# Patient Record
Sex: FEMALE | Race: WHITE | Employment: FULL TIME | URBAN - METROPOLITAN AREA
[De-identification: names, ages, dates, MRNs, and addresses within clinical notes are randomized per-mention and may not be internally consistent; named-entity substitution may affect disease eponyms.]

---

## 2019-11-04 ENCOUNTER — HOSPITAL ENCOUNTER (EMERGENCY)
Age: 49
Discharge: STILL ADMITTED OR ADMINISTRATIVE | End: 2019-11-04
Attending: EMERGENCY MEDICINE
Payer: COMMERCIAL

## 2019-11-04 ENCOUNTER — HOSPITAL ENCOUNTER (OUTPATIENT)
Dept: MRI IMAGING | Age: 49
Discharge: HOME | End: 2019-11-04
Attending: OBSTETRICS & GYNECOLOGY
Payer: COMMERCIAL

## 2019-11-04 VITALS — WEIGHT: 142 LBS

## 2019-11-04 DIAGNOSIS — N90.7 VULVAR CYST: ICD-10-CM

## 2019-11-04 DIAGNOSIS — Z53.21 PATIENT LEFT WITHOUT BEING SEEN: Primary | ICD-10-CM

## 2019-11-04 PROCEDURE — 72197 MRI PELVIS W/O & W/DYE: CPT

## 2019-11-04 PROCEDURE — 74011250636 HC RX REV CODE- 250/636: Performed by: OBSTETRICS & GYNECOLOGY

## 2019-11-04 PROCEDURE — A9575 INJ GADOTERATE MEGLUMI 0.1ML: HCPCS | Performed by: OBSTETRICS & GYNECOLOGY

## 2019-11-04 PROCEDURE — 75810000275 HC EMERGENCY DEPT VISIT NO LEVEL OF CARE

## 2019-11-04 RX ORDER — SODIUM CHLORIDE 0.9 % (FLUSH) 0.9 %
10 SYRINGE (ML) INJECTION
Status: COMPLETED | OUTPATIENT
Start: 2019-11-04 | End: 2019-11-04

## 2019-11-04 RX ORDER — GADOTERATE MEGLUMINE 376.9 MG/ML
13 INJECTION INTRAVENOUS
Status: COMPLETED | OUTPATIENT
Start: 2019-11-04 | End: 2019-11-04

## 2019-11-04 RX ADMIN — Medication 10 ML: at 18:00

## 2019-11-04 RX ADMIN — GADOTERATE MEGLUMINE 13 ML: 376.9 INJECTION INTRAVENOUS at 18:00

## 2019-11-04 NOTE — ED PROVIDER NOTES
HPI     5:16 PM    I was inadvertently assigned to this patient's treatment team.  I did not see this patient nor did I have any contact with this patient. I had no involvement during the evaluation, treatment or disposition of this patient. I am signing off this note to indicate only why my name appeared in the record. Lopez Arevalo MD    No past medical history on file. No past surgical history on file. No family history on file. Social History     Socioeconomic History    Marital status:      Spouse name: Not on file    Number of children: Not on file    Years of education: Not on file    Highest education level: Not on file   Occupational History    Not on file   Social Needs    Financial resource strain: Not on file    Food insecurity:     Worry: Not on file     Inability: Not on file    Transportation needs:     Medical: Not on file     Non-medical: Not on file   Tobacco Use    Smoking status: Not on file   Substance and Sexual Activity    Alcohol use: Not on file    Drug use: Not on file    Sexual activity: Not on file   Lifestyle    Physical activity:     Days per week: Not on file     Minutes per session: Not on file    Stress: Not on file   Relationships    Social connections:     Talks on phone: Not on file     Gets together: Not on file     Attends Sikhism service: Not on file     Active member of club or organization: Not on file     Attends meetings of clubs or organizations: Not on file     Relationship status: Not on file    Intimate partner violence:     Fear of current or ex partner: Not on file     Emotionally abused: Not on file     Physically abused: Not on file     Forced sexual activity: Not on file   Other Topics Concern    Not on file   Social History Narrative    Not on file         ALLERGIES: Patient has no allergy information on record. Review of Systems    There were no vitals filed for this visit.          Physical Exam     MDM Procedures

## 2019-11-11 ENCOUNTER — OFFICE VISIT (OUTPATIENT)
Dept: GYNECOLOGY | Age: 49
End: 2019-11-11

## 2019-11-11 VITALS
HEIGHT: 66 IN | WEIGHT: 146.2 LBS | BODY MASS INDEX: 23.5 KG/M2 | HEART RATE: 61 BPM | DIASTOLIC BLOOD PRESSURE: 86 MMHG | SYSTOLIC BLOOD PRESSURE: 139 MMHG

## 2019-11-11 DIAGNOSIS — N90.89 VULVAR MASS: Primary | ICD-10-CM

## 2019-11-11 DIAGNOSIS — D17.30 LIPOMA OF SKIN: ICD-10-CM

## 2019-11-11 RX ORDER — NORETHINDRONE ACETATE AND ETHINYL ESTRADIOL, AND FERROUS FUMARATE 1MG-20(24)
KIT ORAL
Refills: 3 | Status: ON HOLD | COMMUNITY
Start: 2019-08-31 | End: 2022-02-04

## 2019-11-11 RX ORDER — GLYCOPYRROLATE 1 MG/1
TABLET ORAL
Refills: 7 | COMMUNITY
Start: 2019-11-06

## 2019-11-11 NOTE — LETTER
11/11/2019 1:46 PM 
 
Patient:  Jailyn Wright YOB: 1970 Date of Visit: 11/11/2019 Jose Manuel Martinez MD 
[address] [address] VIA In Basket Dear Jose Manuel Martinez MD, Thank you for referring Ms. Jailyn Wright to [address] [place] [address] for evaluation and treatment. Below are the relevant portions of my assessment and plan of care. Ms. Jailyn Wright is a  47 y.o. female with a vulvar mass that appears consistent with a lipoma. I have recommended a resection of the lipoma, which the patient will schedule at her own convenience in the coming month. Her risk of malignancy is low based on her exam and MRI findings, <1%. Thank you very much for your referral of Ms. Jailyn Wright. If you have questions, please do not hesitate to call me. I look forward to following Ms. Tequila Kuhn along with you and will keep you updated as to her progress.   
 
 
 
 
Sincerely, 
 
 
Martha Quinn MD

## 2019-11-11 NOTE — PROGRESS NOTES
New Patient, Referred by Dr. Nika Jones for vulva mass    1. Have you been to the ER, urgent care clinic since your last visit? Hospitalized since your last visit?  no    2. Have you seen or consulted any other health care providers outside of the [address] [place] [address] since your last visit? Include any pap smears or colon screening.    no

## 2019-11-11 NOTE — PROGRESS NOTES
27 [facility] [address], [address] [place] [address] [phone]  F [phone]    Office Note  Patient ID:  Name:  Kaycee Lozada  MRN:  [id number]  :  1970/49 y.o. Date:  2019      HISTORY OF PRESENT ILLNESS:  Ms. Kaycee Lozada is a 47 y.o.  postmenopausal female who presents in consultation from Dr. Louann Marie for a left vulvar mass. The patient reports that about a month ago she noticed that her left vulva felt different at the time of shower. Denies pain in this area or skin changes. She ultimately presented to her dermatologist, who ultimately referred her to [facility], Dr. Louann Marie. MRI on 19 is consistent with a \"3.5cm fat-containing mass, likely a lipoma\". She was ultimately referred to our office for further management. Overall she is doing well without complaints. Denies vaginal bleeding/discharge, abdominal/pelvic pain, nausea, vomiting, constipation, diarrhea, CP, SOB, hematuria, hematochezia, change in appetite or bowel movements, bloating, fevers, chills, or urinary symptoms. Pertinent PMH/PSH: n/a      Active, no restrictions. Imaging Review:   MRI 19: (from her other MRN; the charts will be merged in the future)  FINDINGS: The uterus measures 6.4 x 3.3 x 3.0 cm. Mildly hyperintense T2 fibroid  partially enhances in the anterior body myometrium and measures approximately  0.9 cm in diameter. No other definable fibroid. The endometrial stripe measures 0.4 cm. The junctional zone measures 0.5 cm. The  cervix is within normal limits. Vagina is within normal limits. Labia minora are heterogeneous. However, there is no definable cyst or mass. No  cyst or mass in the labia majora. In the deep, inferior left vulva, a lobulated,  septated, encapsulated fatty mass measures 3.5 x 1.7 x 2.3 cm. No pathologic  enhancement. The ovaries are atrophied. No adnexal mass or cyst.  Urinary bladder is partially distended.  No urethral diverticulum. There is no free fluid. Bones are within normal limits. IMPRESSION  IMPRESSION:   1. Palpable mass in the deep left bulb represents a nonaggressive 3.5 cm  fat-containing mass, likely a lipoma. No suspicious imaging features to suggest  liposarcoma. If there is increase in size, consider repeat imaging or resection. 2. No enhancing mass or cyst.    ROS:  A comprehensive review of systems was negative except for that written in the History of Present Illness. , 10 point ROS    OB/GYN ROS:  Patient denies significant menstrual problems. Menopausal    ECOG ndGndrndanddndend:nd nd2nd Problem List:  Patient Active Problem List    Diagnosis Date Noted    Vulvar mass 2019    Lipoma of skin 2019     PMH:  History reviewed. No pertinent past medical history. PSH:  Past Surgical History:   Procedure Laterality Date    HX BREAST AUGMENTATION      HX HERNIA REPAIR        Social History:  Social History     Tobacco Use    Smoking status: Never Smoker    Smokeless tobacco: Never Used   Substance Use Topics    Alcohol use: Not on file      Family History:  Family History   Problem Relation Age of Onset    Pancreatic Cancer Father         , occular melanoma    Colon Cancer Maternal Grandmother     Cancer Paternal Grandmother         unknown type    Breast Cancer Other         paternal cousin    Ovarian Cancer Other         paternal side of family      Medications: (reviewed)  Current Outpatient Medications   Medication Sig    TAYTULLA 1 mg-20 mcg (24)/75 mg (4) cap TK 1 C PO D    glycopyrrolate (ROBINUL) 1 mg tablet TK 1 T PO QD PRN     No current facility-administered medications for this visit. Allergies: (reviewed)  No Known Allergies     Gyn History:   Last pap: reportedly normal last year.    History of abnormal pap: yes, in ? - but normal since  Menses: post-menopausal    OBJECTIVE:    Physical Exam:  VITAL SIGNS: Vitals:    19 0903   BP: 139/86   Pulse: 61   Weight: 146 lb 3.2 oz (66.3 kg)   Height: 5' 6\" (1.676 m)     Body mass index is 23.6 kg/m². GENERAL JIM: Conversant, alert, oriented. No acute distress. HEENT: HEENT. No thyroid enlargement. No JVD. Neck: Supple without restrictions. RESPIRATORY: Clear to auscultation and percussion to the bases. No CVAT. CARDIOVASC: RRR without murmur/rub. GASTROINT: soft, non-tender, without masses or organomegaly   MUSCULOSKEL: no joint tenderness, deformity or swelling       EXTREMITIES: extremities normal, atraumatic, no cyanosis or edema   PELVIC: Exam chaperoned by nurse. Normal appearing external genitalia. Just lateral to the left labia majora is a 2x4cm soft lipoma, easily mobile. No skin changes. On speculum exam, normal appearing vagina and cervix. On bimanual exam, the cervix and uterus are normal size and mobile. No evidence of adnexal masses or nodularity. RECTAL: deferred   LES SURVEY: No suspicious lymphadenopathy or edema noted. NEURO: Grossly intact. No acute deficit. Lab Date as available:    No results found for: WBC, HGB, HCT, PLT, MCV, HGBEXT, HCTEXT, PLTEXT, HGBEXT, HCTEXT, PLTEXT  No results found for: NA, K, CL, CO2, AGAP, GLU, BUN, CREA, BUCR, GFRAA, GFRNA, CA      IMPRESSION/PLAN:    Ms. Estela Truong is a 47 y.o. female with a working diagnosis of left vulvar mass consistent with a lipoma    Problems:     Patient Active Problem List    Diagnosis Date Noted    Vulvar mass 11/11/2019    Lipoma of skin 11/11/2019       I reviewed Ms. Trinity Mcclendon's course to date, including her medical records, recent studies, physical exam, and review of symptoms. Counseled patient regarding lipomas and the overall benign appearance on imaging. Low risk of malignancy, <1%; however, I have recommended surgical resection. Posted at the patient's convenience for EUA, simple vulvectomy. Reviewed risks, benefits, indications, and alternatives of surgery.  Will collect CBCD, CMP, CXR, and EKG prior to surgery. All questions and concerns were addressed with the patient and she is comfortable with the plan.      Defined Sensitive Document    >50% of total time allocated to visit dedicated to counseling, 45 minutes total.    Signed By: Amol Hu MD     11/11/2019/1:36 PM

## 2019-12-16 ENCOUNTER — HOSPITAL ENCOUNTER (OUTPATIENT)
Dept: PREADMISSION TESTING | Age: 49
Discharge: HOME | End: 2019-12-16
Payer: COMMERCIAL

## 2019-12-16 ENCOUNTER — HOSPITAL ENCOUNTER (OUTPATIENT)
Dept: GENERAL RADIOLOGY | Age: 49
Discharge: HOME | End: 2019-12-16
Attending: OBSTETRICS & GYNECOLOGY
Payer: COMMERCIAL

## 2019-12-16 VITALS
HEART RATE: 68 BPM | BODY MASS INDEX: 22.13 KG/M2 | TEMPERATURE: 98.2 F | SYSTOLIC BLOOD PRESSURE: 138 MMHG | WEIGHT: 141 LBS | DIASTOLIC BLOOD PRESSURE: 88 MMHG | HEIGHT: 67 IN

## 2019-12-16 LAB
ALBUMIN SERPL-MCNC: 4.2 G/DL (ref 3.5–5)
ALBUMIN/GLOB SERPL: 1.4 {RATIO} (ref 1.1–2.2)
ALP SERPL-CCNC: 39 U/L (ref 45–117)
ALT SERPL-CCNC: 37 U/L (ref 12–78)
ANION GAP SERPL CALC-SCNC: 6 MMOL/L (ref 5–15)
AST SERPL-CCNC: 48 U/L (ref 15–37)
BASOPHILS # BLD: 0 K/UL (ref 0–0.1)
BASOPHILS NFR BLD: 1 % (ref 0–1)
BILIRUB SERPL-MCNC: 0.6 MG/DL (ref 0.2–1)
BUN SERPL-MCNC: 17 MG/DL (ref 6–20)
BUN/CREAT SERPL: 15 (ref 12–20)
CALCIUM SERPL-MCNC: 9.5 MG/DL (ref 8.5–10.1)
CHLORIDE SERPL-SCNC: 101 MMOL/L (ref 97–108)
CO2 SERPL-SCNC: 29 MMOL/L (ref 21–32)
CREAT SERPL-MCNC: 1.1 MG/DL (ref 0.55–1.02)
DIFFERENTIAL METHOD BLD: NORMAL
EOSINOPHIL # BLD: 0.1 K/UL (ref 0–0.4)
EOSINOPHIL NFR BLD: 2 % (ref 0–7)
ERYTHROCYTE [DISTWIDTH] IN BLOOD BY AUTOMATED COUNT: 13 % (ref 11.5–14.5)
GLOBULIN SER CALC-MCNC: 3 G/DL (ref 2–4)
GLUCOSE SERPL-MCNC: 76 MG/DL (ref 65–100)
HCT VFR BLD AUTO: 43.2 % (ref 35–47)
HGB BLD-MCNC: 13.7 G/DL (ref 11.5–16)
IMM GRANULOCYTES # BLD AUTO: 0 K/UL (ref 0–0.04)
IMM GRANULOCYTES NFR BLD AUTO: 0 % (ref 0–0.5)
LYMPHOCYTES # BLD: 2.1 K/UL (ref 0.8–3.5)
LYMPHOCYTES NFR BLD: 32 % (ref 12–49)
MCH RBC QN AUTO: 29.9 PG (ref 26–34)
MCHC RBC AUTO-ENTMCNC: 31.7 G/DL (ref 30–36.5)
MCV RBC AUTO: 94.3 FL (ref 80–99)
MONOCYTES # BLD: 0.5 K/UL (ref 0–1)
MONOCYTES NFR BLD: 8 % (ref 5–13)
NEUTS SEG # BLD: 3.7 K/UL (ref 1.8–8)
NEUTS SEG NFR BLD: 57 % (ref 32–75)
NRBC # BLD: 0 K/UL (ref 0–0.01)
NRBC BLD-RTO: 0 PER 100 WBC
PLATELET # BLD AUTO: 209 K/UL (ref 150–400)
PMV BLD AUTO: 10.3 FL (ref 8.9–12.9)
POTASSIUM SERPL-SCNC: 3.8 MMOL/L (ref 3.5–5.1)
PROT SERPL-MCNC: 7.2 G/DL (ref 6.4–8.2)
RBC # BLD AUTO: 4.58 M/UL (ref 3.8–5.2)
SODIUM SERPL-SCNC: 136 MMOL/L (ref 136–145)
WBC # BLD AUTO: 6.4 K/UL (ref 3.6–11)

## 2019-12-16 PROCEDURE — 36415 COLL VENOUS BLD VENIPUNCTURE: CPT

## 2019-12-16 PROCEDURE — 80053 COMPREHEN METABOLIC PANEL: CPT

## 2019-12-16 PROCEDURE — 93005 ELECTROCARDIOGRAM TRACING: CPT

## 2019-12-16 PROCEDURE — 71046 X-RAY EXAM CHEST 2 VIEWS: CPT

## 2019-12-16 PROCEDURE — 85025 COMPLETE CBC W/AUTO DIFF WBC: CPT

## 2019-12-16 RX ORDER — ZINC GLUCONATE 10 MG
1 LOZENGE ORAL DAILY
COMMUNITY

## 2019-12-16 RX ORDER — ACETAMINOPHEN, DIPHENHYDRAMINE HCL, PHENYLEPHRINE HCL 325; 25; 5 MG/1; MG/1; MG/1
1 TABLET ORAL DAILY
COMMUNITY

## 2019-12-16 RX ORDER — BISMUTH SUBSALICYLATE 262 MG
1 TABLET,CHEWABLE ORAL DAILY
COMMUNITY

## 2019-12-16 NOTE — PERIOP NOTES
PATIENT GIVEN SURGICAL SITE INFECTION FAQ HANDOUT AND HAND WASHING TIP SHEET. PREOP INSTRUCTIONS REVIEWED AND PATIENT VERBALIZES UNDERSTANDING OF INSTRUCTIONS. PATIENT HAS BEEN GIVEN THE OPPORTUNITY TO ASK QUESTIONS.   HIBICLENS WITH INSTRUCTIONS WERE GIVEN TO THE PT.

## 2019-12-17 LAB
ATRIAL RATE: 55 BPM
CALCULATED P AXIS, ECG09: 50 DEGREES
CALCULATED R AXIS, ECG10: 75 DEGREES
CALCULATED T AXIS, ECG11: 47 DEGREES
DIAGNOSIS, 93000: NORMAL
P-R INTERVAL, ECG05: 130 MS
Q-T INTERVAL, ECG07: 418 MS
QRS DURATION, ECG06: 82 MS
QTC CALCULATION (BEZET), ECG08: 399 MS
VENTRICULAR RATE, ECG03: 55 BPM

## 2019-12-19 ENCOUNTER — ANESTHESIA EVENT (OUTPATIENT)
Dept: SURGERY | Age: 49
End: 2019-12-19
Payer: COMMERCIAL

## 2019-12-20 ENCOUNTER — HOSPITAL ENCOUNTER (OUTPATIENT)
Age: 49
Setting detail: OUTPATIENT SURGERY
Discharge: HOME | End: 2019-12-20
Attending: OBSTETRICS & GYNECOLOGY | Admitting: OBSTETRICS & GYNECOLOGY
Payer: COMMERCIAL

## 2019-12-20 ENCOUNTER — ANESTHESIA (OUTPATIENT)
Dept: SURGERY | Age: 49
End: 2019-12-20
Payer: COMMERCIAL

## 2019-12-20 VITALS
BODY MASS INDEX: 22.42 KG/M2 | WEIGHT: 141 LBS | DIASTOLIC BLOOD PRESSURE: 60 MMHG | HEART RATE: 71 BPM | SYSTOLIC BLOOD PRESSURE: 106 MMHG | RESPIRATION RATE: 16 BRPM | OXYGEN SATURATION: 100 % | TEMPERATURE: 97.3 F

## 2019-12-20 LAB — HCG UR QL: NEGATIVE

## 2019-12-20 PROCEDURE — 76210000006 HC OR PH I REC 0.5 TO 1 HR: Performed by: OBSTETRICS & GYNECOLOGY

## 2019-12-20 PROCEDURE — 74011000250 HC RX REV CODE- 250: Performed by: NURSE ANESTHETIST, CERTIFIED REGISTERED

## 2019-12-20 PROCEDURE — 77030040361 HC SLV COMPR DVT MDII -B: Performed by: OBSTETRICS & GYNECOLOGY

## 2019-12-20 PROCEDURE — 76060000032 HC ANESTHESIA 0.5 TO 1 HR: Performed by: OBSTETRICS & GYNECOLOGY

## 2019-12-20 PROCEDURE — 77030031139 HC SUT VCRL2 J&J -A: Performed by: OBSTETRICS & GYNECOLOGY

## 2019-12-20 PROCEDURE — 77030011283 HC ELECTRD NDL COVD -A: Performed by: OBSTETRICS & GYNECOLOGY

## 2019-12-20 PROCEDURE — 76010000138 HC OR TIME 0.5 TO 1 HR: Performed by: OBSTETRICS & GYNECOLOGY

## 2019-12-20 PROCEDURE — 74011250636 HC RX REV CODE- 250/636: Performed by: NURSE ANESTHETIST, CERTIFIED REGISTERED

## 2019-12-20 PROCEDURE — 76210000020 HC REC RM PH II FIRST 0.5 HR: Performed by: OBSTETRICS & GYNECOLOGY

## 2019-12-20 PROCEDURE — 77030011640 HC PAD GRND REM COVD -A: Performed by: OBSTETRICS & GYNECOLOGY

## 2019-12-20 PROCEDURE — 74011250636 HC RX REV CODE- 250/636: Performed by: OBSTETRICS & GYNECOLOGY

## 2019-12-20 PROCEDURE — 81025 URINE PREGNANCY TEST: CPT

## 2019-12-20 PROCEDURE — 77030040922 HC BLNKT HYPOTHRM STRY -A

## 2019-12-20 PROCEDURE — 88305 TISSUE EXAM BY PATHOLOGIST: CPT

## 2019-12-20 PROCEDURE — 74011250637 HC RX REV CODE- 250/637: Performed by: ANESTHESIOLOGY

## 2019-12-20 PROCEDURE — 74011000250 HC RX REV CODE- 250: Performed by: OBSTETRICS & GYNECOLOGY

## 2019-12-20 RX ORDER — FENTANYL CITRATE 50 UG/ML
25 INJECTION, SOLUTION INTRAMUSCULAR; INTRAVENOUS
Status: DISCONTINUED | OUTPATIENT
Start: 2019-12-20 | End: 2019-12-20 | Stop reason: HOSPADM

## 2019-12-20 RX ORDER — SODIUM CHLORIDE 0.9 % (FLUSH) 0.9 %
5-40 SYRINGE (ML) INJECTION AS NEEDED
Status: DISCONTINUED | OUTPATIENT
Start: 2019-12-20 | End: 2019-12-20 | Stop reason: HOSPADM

## 2019-12-20 RX ORDER — PHENYLEPHRINE HCL IN 0.9% NACL 0.4MG/10ML
SYRINGE (ML) INTRAVENOUS AS NEEDED
Status: DISCONTINUED | OUTPATIENT
Start: 2019-12-20 | End: 2019-12-20 | Stop reason: HOSPADM

## 2019-12-20 RX ORDER — SODIUM CHLORIDE, SODIUM LACTATE, POTASSIUM CHLORIDE, CALCIUM CHLORIDE 600; 310; 30; 20 MG/100ML; MG/100ML; MG/100ML; MG/100ML
125 INJECTION, SOLUTION INTRAVENOUS CONTINUOUS
Status: DISCONTINUED | OUTPATIENT
Start: 2019-12-20 | End: 2019-12-20 | Stop reason: HOSPADM

## 2019-12-20 RX ORDER — FENTANYL CITRATE 50 UG/ML
INJECTION, SOLUTION INTRAMUSCULAR; INTRAVENOUS AS NEEDED
Status: DISCONTINUED | OUTPATIENT
Start: 2019-12-20 | End: 2019-12-20 | Stop reason: HOSPADM

## 2019-12-20 RX ORDER — ONDANSETRON 2 MG/ML
INJECTION INTRAMUSCULAR; INTRAVENOUS AS NEEDED
Status: DISCONTINUED | OUTPATIENT
Start: 2019-12-20 | End: 2019-12-20 | Stop reason: HOSPADM

## 2019-12-20 RX ORDER — KETOROLAC TROMETHAMINE 30 MG/ML
INJECTION, SOLUTION INTRAMUSCULAR; INTRAVENOUS AS NEEDED
Status: DISCONTINUED | OUTPATIENT
Start: 2019-12-20 | End: 2019-12-20 | Stop reason: HOSPADM

## 2019-12-20 RX ORDER — DEXAMETHASONE SODIUM PHOSPHATE 4 MG/ML
INJECTION, SOLUTION INTRA-ARTICULAR; INTRALESIONAL; INTRAMUSCULAR; INTRAVENOUS; SOFT TISSUE AS NEEDED
Status: DISCONTINUED | OUTPATIENT
Start: 2019-12-20 | End: 2019-12-20 | Stop reason: HOSPADM

## 2019-12-20 RX ORDER — DIPHENHYDRAMINE HYDROCHLORIDE 50 MG/ML
12.5 INJECTION, SOLUTION INTRAMUSCULAR; INTRAVENOUS AS NEEDED
Status: DISCONTINUED | OUTPATIENT
Start: 2019-12-20 | End: 2019-12-20 | Stop reason: HOSPADM

## 2019-12-20 RX ORDER — PROPOFOL 10 MG/ML
INJECTION, EMULSION INTRAVENOUS AS NEEDED
Status: DISCONTINUED | OUTPATIENT
Start: 2019-12-20 | End: 2019-12-20 | Stop reason: HOSPADM

## 2019-12-20 RX ORDER — LIDOCAINE HYDROCHLORIDE AND EPINEPHRINE 10; 10 MG/ML; UG/ML
INJECTION, SOLUTION INFILTRATION; PERINEURAL AS NEEDED
Status: DISCONTINUED | OUTPATIENT
Start: 2019-12-20 | End: 2019-12-20 | Stop reason: HOSPADM

## 2019-12-20 RX ORDER — LIDOCAINE HYDROCHLORIDE 10 MG/ML
0.5 INJECTION, SOLUTION EPIDURAL; INFILTRATION; INTRACAUDAL; PERINEURAL AS NEEDED
Status: DISCONTINUED | OUTPATIENT
Start: 2019-12-20 | End: 2019-12-20 | Stop reason: HOSPADM

## 2019-12-20 RX ORDER — DEXTROSE, SODIUM CHLORIDE, SODIUM LACTATE, POTASSIUM CHLORIDE, AND CALCIUM CHLORIDE 5; .6; .31; .03; .02 G/100ML; G/100ML; G/100ML; G/100ML; G/100ML
125 INJECTION, SOLUTION INTRAVENOUS CONTINUOUS
Status: DISCONTINUED | OUTPATIENT
Start: 2019-12-20 | End: 2019-12-20 | Stop reason: HOSPADM

## 2019-12-20 RX ORDER — ONDANSETRON 2 MG/ML
4 INJECTION INTRAMUSCULAR; INTRAVENOUS AS NEEDED
Status: DISCONTINUED | OUTPATIENT
Start: 2019-12-20 | End: 2019-12-20 | Stop reason: HOSPADM

## 2019-12-20 RX ORDER — MIDAZOLAM HYDROCHLORIDE 1 MG/ML
1 INJECTION, SOLUTION INTRAMUSCULAR; INTRAVENOUS AS NEEDED
Status: DISCONTINUED | OUTPATIENT
Start: 2019-12-20 | End: 2019-12-20 | Stop reason: HOSPADM

## 2019-12-20 RX ORDER — CEFAZOLIN SODIUM/WATER 2 G/20 ML
2 SYRINGE (ML) INTRAVENOUS
Status: COMPLETED | OUTPATIENT
Start: 2019-12-20 | End: 2019-12-20

## 2019-12-20 RX ORDER — PROPOFOL 10 MG/ML
INJECTION, EMULSION INTRAVENOUS
Status: DISCONTINUED | OUTPATIENT
Start: 2019-12-20 | End: 2019-12-20 | Stop reason: HOSPADM

## 2019-12-20 RX ORDER — MIDAZOLAM HYDROCHLORIDE 1 MG/ML
1 INJECTION, SOLUTION INTRAMUSCULAR; INTRAVENOUS
Status: DISCONTINUED | OUTPATIENT
Start: 2019-12-20 | End: 2019-12-20 | Stop reason: HOSPADM

## 2019-12-20 RX ORDER — MORPHINE SULFATE 10 MG/ML
2 INJECTION, SOLUTION INTRAMUSCULAR; INTRAVENOUS
Status: DISCONTINUED | OUTPATIENT
Start: 2019-12-20 | End: 2019-12-20 | Stop reason: HOSPADM

## 2019-12-20 RX ORDER — SODIUM CHLORIDE 0.9 % (FLUSH) 0.9 %
5-40 SYRINGE (ML) INJECTION EVERY 8 HOURS
Status: DISCONTINUED | OUTPATIENT
Start: 2019-12-20 | End: 2019-12-20 | Stop reason: HOSPADM

## 2019-12-20 RX ORDER — DEXMEDETOMIDINE HYDROCHLORIDE 100 UG/ML
INJECTION, SOLUTION INTRAVENOUS AS NEEDED
Status: DISCONTINUED | OUTPATIENT
Start: 2019-12-20 | End: 2019-12-20 | Stop reason: HOSPADM

## 2019-12-20 RX ORDER — SODIUM CHLORIDE, SODIUM LACTATE, POTASSIUM CHLORIDE, CALCIUM CHLORIDE 600; 310; 30; 20 MG/100ML; MG/100ML; MG/100ML; MG/100ML
INJECTION, SOLUTION INTRAVENOUS
Status: DISCONTINUED | OUTPATIENT
Start: 2019-12-20 | End: 2019-12-20 | Stop reason: HOSPADM

## 2019-12-20 RX ORDER — ACETAMINOPHEN 325 MG/1
650 TABLET ORAL ONCE
Status: COMPLETED | OUTPATIENT
Start: 2019-12-20 | End: 2019-12-20

## 2019-12-20 RX ORDER — GLYCOPYRROLATE 0.2 MG/ML
INJECTION INTRAMUSCULAR; INTRAVENOUS AS NEEDED
Status: DISCONTINUED | OUTPATIENT
Start: 2019-12-20 | End: 2019-12-20 | Stop reason: HOSPADM

## 2019-12-20 RX ORDER — FENTANYL CITRATE 50 UG/ML
50 INJECTION, SOLUTION INTRAMUSCULAR; INTRAVENOUS AS NEEDED
Status: DISCONTINUED | OUTPATIENT
Start: 2019-12-20 | End: 2019-12-20 | Stop reason: HOSPADM

## 2019-12-20 RX ORDER — OXYCODONE HYDROCHLORIDE 5 MG/1
5 TABLET ORAL AS NEEDED
Status: DISCONTINUED | OUTPATIENT
Start: 2019-12-20 | End: 2019-12-20 | Stop reason: HOSPADM

## 2019-12-20 RX ORDER — MIDAZOLAM HYDROCHLORIDE 1 MG/ML
INJECTION, SOLUTION INTRAMUSCULAR; INTRAVENOUS AS NEEDED
Status: DISCONTINUED | OUTPATIENT
Start: 2019-12-20 | End: 2019-12-20 | Stop reason: HOSPADM

## 2019-12-20 RX ORDER — LIDOCAINE HYDROCHLORIDE 20 MG/ML
INJECTION, SOLUTION EPIDURAL; INFILTRATION; INTRACAUDAL; PERINEURAL AS NEEDED
Status: DISCONTINUED | OUTPATIENT
Start: 2019-12-20 | End: 2019-12-20 | Stop reason: HOSPADM

## 2019-12-20 RX ADMIN — LIDOCAINE HYDROCHLORIDE 80 MG: 20 INJECTION, SOLUTION EPIDURAL; INFILTRATION; INTRACAUDAL; PERINEURAL at 07:34

## 2019-12-20 RX ADMIN — MIDAZOLAM HYDROCHLORIDE 2 MG: 1 INJECTION, SOLUTION INTRAMUSCULAR; INTRAVENOUS at 07:34

## 2019-12-20 RX ADMIN — ONDANSETRON HYDROCHLORIDE 4 MG: 2 INJECTION, SOLUTION INTRAMUSCULAR; INTRAVENOUS at 07:43

## 2019-12-20 RX ADMIN — Medication 40 MCG: at 07:54

## 2019-12-20 RX ADMIN — ACETAMINOPHEN 650 MG: 325 TABLET ORAL at 07:22

## 2019-12-20 RX ADMIN — Medication 2 G: at 07:41

## 2019-12-20 RX ADMIN — DEXAMETHASONE SODIUM PHOSPHATE 4 MG: 4 INJECTION, SOLUTION INTRAMUSCULAR; INTRAVENOUS at 07:43

## 2019-12-20 RX ADMIN — DEXMEDETOMIDINE HYDROCHLORIDE 2 MCG: 100 INJECTION, SOLUTION, CONCENTRATE INTRAVENOUS at 07:37

## 2019-12-20 RX ADMIN — PROPOFOL 150 MG: 10 INJECTION, EMULSION INTRAVENOUS at 07:38

## 2019-12-20 RX ADMIN — GLYCOPYRROLATE 0.2 MG: 0.2 INJECTION, SOLUTION INTRAMUSCULAR; INTRAVENOUS at 07:34

## 2019-12-20 RX ADMIN — PROPOFOL 50 MG: 10 INJECTION, EMULSION INTRAVENOUS at 07:34

## 2019-12-20 RX ADMIN — FENTANYL CITRATE 50 MCG: 50 INJECTION, SOLUTION INTRAMUSCULAR; INTRAVENOUS at 07:50

## 2019-12-20 RX ADMIN — KETOROLAC TROMETHAMINE 30 MG: 30 INJECTION, SOLUTION INTRAMUSCULAR; INTRAVENOUS at 08:05

## 2019-12-20 RX ADMIN — DEXMEDETOMIDINE HYDROCHLORIDE 2 MCG: 100 INJECTION, SOLUTION, CONCENTRATE INTRAVENOUS at 07:34

## 2019-12-20 RX ADMIN — Medication 40 MCG: at 08:07

## 2019-12-20 RX ADMIN — SODIUM CHLORIDE, POTASSIUM CHLORIDE, SODIUM LACTATE AND CALCIUM CHLORIDE: 600; 310; 30; 20 INJECTION, SOLUTION INTRAVENOUS at 07:13

## 2019-12-20 RX ADMIN — Medication 80 MCG: at 08:04

## 2019-12-20 RX ADMIN — MIDAZOLAM HYDROCHLORIDE 2 MG: 1 INJECTION, SOLUTION INTRAMUSCULAR; INTRAVENOUS at 07:27

## 2019-12-20 RX ADMIN — DEXMEDETOMIDINE HYDROCHLORIDE 2 MCG: 100 INJECTION, SOLUTION, CONCENTRATE INTRAVENOUS at 08:00

## 2019-12-20 RX ADMIN — MIDAZOLAM HYDROCHLORIDE 1 MG: 1 INJECTION, SOLUTION INTRAMUSCULAR; INTRAVENOUS at 07:29

## 2019-12-20 RX ADMIN — PROPOFOL 75 MCG/KG/MIN: 10 INJECTION, EMULSION INTRAVENOUS at 07:34

## 2019-12-20 RX ADMIN — DEXMEDETOMIDINE HYDROCHLORIDE 2 MCG: 100 INJECTION, SOLUTION, CONCENTRATE INTRAVENOUS at 07:53

## 2019-12-20 NOTE — H&P
[address] [place] [address] [address] [facility], [address] [place] [address] Av  P [phone]  F [phone]    History and Physical  Patient ID:  Name:  Jennifer Anderson  MRN:  [id number]  :  1970/49 y.o. Date:  2019      HISTORY OF PRESENT ILLNESS:  Ms. Jennifer Anderson is a 47 y.o.  postmenopausal female who presents in consultation from Dr. Thelma Roe for a left vulvar mass. The patient reports that about a month ago she noticed that her left vulva felt different at the time of shower. Denies pain in this area or skin changes. She ultimately presented to her dermatologist, who ultimately referred her to [facility], Dr. Thelma Roe. MRI on 19 is consistent with a \"3.5cm fat-containing mass, likely a lipoma\". She was ultimately referred to our office for further management. Overall she is doing well without complaints. Denies vaginal bleeding/discharge, abdominal/pelvic pain, nausea, vomiting, constipation, diarrhea, CP, SOB, hematuria, hematochezia, change in appetite or bowel movements, bloating, fevers, chills, or urinary symptoms. Pertinent PMH/PSH: n/a      Active, no restrictions. Imaging Review:   MRI 19: (from her other MRN; the charts will be merged in the future)  FINDINGS: The uterus measures 6.4 x 3.3 x 3.0 cm. Mildly hyperintense T2 fibroid  partially enhances in the anterior body myometrium and measures approximately  0.9 cm in diameter. No other definable fibroid. The endometrial stripe measures 0.4 cm. The junctional zone measures 0.5 cm. The  cervix is within normal limits. Vagina is within normal limits. Labia minora are heterogeneous. However, there is no definable cyst or mass. No  cyst or mass in the labia majora. In the deep, inferior left vulva, a lobulated,  septated, encapsulated fatty mass measures 3.5 x 1.7 x 2.3 cm. No pathologic  enhancement. The ovaries are atrophied.  No adnexal mass or cyst.  Urinary bladder is partially distended. No urethral diverticulum. There is no free fluid. Bones are within normal limits. IMPRESSION  IMPRESSION:   1. Palpable mass in the deep left bulb represents a nonaggressive 3.5 cm  fat-containing mass, likely a lipoma. No suspicious imaging features to suggest  liposarcoma. If there is increase in size, consider repeat imaging or resection. 2. No enhancing mass or cyst.    ROS:  A comprehensive review of systems was negative except for that written in the History of Present Illness. , 10 point ROS    OB/GYN ROS:  Patient denies significant menstrual problems. Menopausal    ECOG ndGndrndanddndend:nd nd2nd Problem List:  Patient Active Problem List    Diagnosis Date Noted    Vulvar mass 2019    Lipoma of skin 2019     PMH:  Past Medical History:   Diagnosis Date    Ill-defined condition     DEPRESSION      PSH:  Past Surgical History:   Procedure Laterality Date    HX BREAST AUGMENTATION      HX BREAST BIOPSY Left     NEG.  HX GI  2016    COLONOSCOPY    HX HERNIA REPAIR Right       Social History:  Social History     Tobacco Use    Smoking status: Never Smoker    Smokeless tobacco: Never Used   Substance Use Topics    Alcohol use: Never     Frequency: Never      Family History:  Family History   Problem Relation Age of Onset    Heart Disease Mother     High Cholesterol Mother     Pancreatic Cancer Father         , occular melanoma    Colon Cancer Maternal Grandmother     Cancer Paternal Grandmother         unknown type    Breast Cancer Other         paternal cousin    Ovarian Cancer Other         paternal side of family    No Known Problems Sister       Medications: (reviewed)  No current facility-administered medications for this encounter. Allergies: (reviewed)  No Known Allergies     Gyn History:   Last pap: reportedly normal last year.    History of abnormal pap: yes, in ? - but normal since  Menses: post-menopausal    OBJECTIVE:    Physical Exam:  VITAL SIGNS: There were no vitals filed for this visit. There is no height or weight on file to calculate BMI. GENERAL JIM: Conversant, alert, oriented. No acute distress. HEENT: HEENT. No thyroid enlargement. No JVD. Neck: Supple without restrictions. RESPIRATORY: Clear to auscultation and percussion to the bases. No CVAT. CARDIOVASC: RRR without murmur/rub. GASTROINT: soft, non-tender, without masses or organomegaly   MUSCULOSKEL: no joint tenderness, deformity or swelling       EXTREMITIES: extremities normal, atraumatic, no cyanosis or edema   PELVIC: Exam chaperoned by nurse. Normal appearing external genitalia. Just lateral to the left labia majora is a 2x4cm soft lipoma, easily mobile. No skin changes. On speculum exam, normal appearing vagina and cervix. On bimanual exam, the cervix and uterus are normal size and mobile. No evidence of adnexal masses or nodularity. RECTAL: deferred   LES SURVEY: No suspicious lymphadenopathy or edema noted. NEURO: Grossly intact. No acute deficit.        Lab Date as available:    Lab Results   Component Value Date/Time    WBC 6.4 12/16/2019 02:47 PM    HGB 13.7 12/16/2019 02:47 PM    HCT 43.2 12/16/2019 02:47 PM    PLATELET 665 91/20/7949 02:47 PM    MCV 94.3 12/16/2019 02:47 PM     Lab Results   Component Value Date/Time    Sodium 136 12/16/2019 02:53 PM    Potassium 3.8 12/16/2019 02:53 PM    Chloride 101 12/16/2019 02:53 PM    CO2 29 12/16/2019 02:53 PM    Anion gap 6 12/16/2019 02:53 PM    Glucose 76 12/16/2019 02:53 PM    BUN 17 12/16/2019 02:53 PM    Creatinine 1.10 (H) 12/16/2019 02:53 PM    BUN/Creatinine ratio 15 12/16/2019 02:53 PM    GFR est AA >60 12/16/2019 02:53 PM    GFR est non-AA 53 (L) 12/16/2019 02:53 PM    Calcium 9.5 12/16/2019 02:53 PM         IMPRESSION/PLAN:    Ms. Lori Laughlin is a 47 y.o. female with a working diagnosis of left vulvar mass consistent with a lipoma    Problems:     Patient Active Problem List    Diagnosis Date Noted    Vulvar mass 11/11/2019    Lipoma of skin 11/11/2019       I reviewed Ms. Grant Mcclendon's course to date, including her medical records, recent studies, physical exam, and review of symptoms. Counseled patient regarding lipomas and the overall benign appearance on imaging. Low risk of malignancy, <1%; however, I have recommended surgical resection. Posted at the patient's convenience for EUA, simple vulvectomy. Reviewed risks, benefits, indications, and alternatives of surgery. Will collect CBCD, CMP, CXR, and EKG prior to surgery. All questions and concerns were addressed with the patient and she is comfortable with the plan. Defined Sensitive Document    >50% of total time allocated to visit dedicated to counseling, 45 minutes total.    Signed By: Rell Poon MD     12/20/2019/1:36 PM     Date of Surgery Update:  Riddhi Tabor was seen and examined. History and physical has been reviewed. The patient has been examined.  There have been no significant clinical changes since the completion of the originally dated History and Physical.    Signed By: Rell Poon MD     December 20, 2019 6:57 AM

## 2019-12-20 NOTE — DISCHARGE INSTRUCTIONS
27 [facility] [address], [address]  P [phone]  F [phone]     Blanca Bowser      Dear Ms. Mae Austin,      Please review your instructions with your nurse and ask any questions so you have all the information you need to recover well at home. If you do not feel you have everything you need to succeed and be safe after you leave the hospital, please discuss these concerns with your nurse. As always, call for any questions at home. Your doctor: Deshawn Sanchez MD   Diagnosis: VULVAR LIPOMA  Procedure: Procedure(s):  PELVIC EXAM UNDER ANESTHESIA, SIMPLE VULVECTOMY, RESECTION OF LEFT VULVAR MASS  Date of Discharge: 12/20/19       Take Home Medications     See Discharge Medication Review provided to you by your nurse. If you did not receive one, request this prior to your discharge. · It is important that you take your medications as they are prescribed. · Keep your medications in the bottles provided by the pharmacist and keep a list of the medication names, dosages, times to be taken and what they are for in your wallet. · Do not take other medications without consulting your doctor. Activity    · If possible, have someone with you at all times until you feel stable. · Gradually increase your activity each day. There are generally no restrictions on walking, climbing stairs or riding in a car. Try to walk at least 4 times per day. · Showers are okay. If you have an incision, no tub bathing/swimming for two weeks. · No driving while on pain medication. Incision    · You should expect some discomfort in the area of your incision, particularly as you increase your activity. If you notice an area of increasing redness or new drainage, please call your doctor. · Many incisions will have buried absorbable sutures, which do not need to be removed and are covered by protective glue.   This will come off over time.      Causes For Concern    If any of the following occur, please call our office and speak with the Nurse/aid who will help you with your problem or ask the doctor to call you.  Problems with the incision, including increasing pain, swelling, redness or drainage.  Inability to pass urine    Fever or chills and a temperature >101F   Excessive vaginal or wound bleeding.  If after hours and you cannot reach an on-call physician, call 911. Follow-Up    Call [phone] to schedule an appointment with Rell Poon MD  in 6 weeks. Information obtained by :  I understand that if any problems occur once I am at home I am to contact my physician. I understand and acknowledge receipt of the instructions indicated above. Physician's or R.N.'s Signature                                                                  Date/Time                                                                                                                                              Patient or Representative Signature                                                          Date/Time            ______________________________________________________________________    Anesthesia Discharge Instructions    After general anesthesia or intervenous sedation, for 24 hours or while taking prescription Narcotics:  · Limit your activities  · Do not drive or operate hazardous machinery  · If you have not urinated within 8 hours after discharge, please contact your surgeon on call.   · Do not make important personal or business decisions  · Do not drink alcoholic beverages    Report the following to your surgeon:  · Excessive pain, swelling, redness or odor of or around the surgical area  · Temperature over 100.5 degrees  · Nausea and vomiting lasting longer than 4 hours or if unable to take medication  · Any signs of decreased circulation or nerve impairment to extremity:  Change in color, persistent numbness, tingling, coldness or increased pain.   · Any questions

## 2019-12-20 NOTE — OP NOTES
Gynecologic Oncology Operative Report    Zora Randhawa    Pre-operative dx: 1) Vulvar mass    Post-operative dx: 1) Vulvar mass consistent with a lipoma    Procedure:  Pelvic exam under anesthesia, resection of vulvar mass    Surgeon:  Milady Moore MD    Assistant: n/a     Anesthesia:  LMA    EBL:  minimal    Complications:  None    Implants: none    Specimens: vulvar mass    Operative indications: 47 y.o. female with 4cm vulvar mass consistent with a lipoma     Operative findings: On exam under anesthesia, there was a [id number] vulvar mass under the skin in the left vulva (in the labia majora from 3-5 o'clock) consistent with a lipoma. Normal appearing external genitalia otherwise. Operative report: After the risks, benefits, indications, and alternatives of the procedure were discussed with the patient and informed consent was obtained, the patient was taken to the operating room. She was positively identified, administered general anesthesia with an LMA, and then placed in the dorsal lithotomy position in [address] [place] [address]. An exam under anesthesia was performed. She was then prepped and draped in the usual fashion. The bladder was drained with a red rubber catheter. Using a 15-blade, a 2cm incision was made overtop of the vulvar mass. Then using the Bovie electrocautery, the incision was carried down to the lipoma. The Lipoma was grasped with an Akua clamp and retraction provided. The mass was then dissected free of its attachments using the Bovie electrocautery. As noted above, the mass was [id number] in size. Once free of all attachments, the specimen was sent for permanent pathology. The surgical wound was then irrigated. The surgical bed was made hemostatic with Bovie electrocautery, and then the wound was closed in two layers. The bottom layer was re-approximated used 2-0 Vicryl, simple interrupted suture.  The skin edges were then re-approximated with 2-0 Vicryl using horizontal and vertical mattress sutures where appropriate. The vulva and perineum were irrigated and hemostasis confirmed. Bacitracin was applied to the wound edges. The patient was taken out of stirrups, awakened from anesthesia, and taken to the recovery room in stable condition. All instrument, sponge, and needle counts were correct.         Elton Carrasco MD  12/20/2019  8:22 AM

## 2019-12-20 NOTE — ANESTHESIA POSTPROCEDURE EVALUATION
Procedure(s):  PELVIC EXAM UNDER ANESTHESIA, SIMPLE VULVECTOMY, RESECTION OF LEFT VULVAR MASS. general    Anesthesia Post Evaluation        Patient location during evaluation: PACU  Patient participation: complete - patient participated  Level of consciousness: awake and alert  Pain management: adequate  Airway patency: patent  Anesthetic complications: no  Cardiovascular status: acceptable  Respiratory status: acceptable  Hydration status: acceptable  Comments: I have seen and evaluated the patient and is ready for discharge. Claudell Morales, MD    Post anesthesia nausea and vomiting:  none      Vitals Value Taken Time   /59 12/20/2019  8:45 AM   Temp 36.3 °C (97.3 °F) 12/20/2019  8:45 AM   Pulse 65 12/20/2019  8:57 AM   Resp 15 12/20/2019  8:57 AM   SpO2 97 % 12/20/2019  8:57 AM   Vitals shown include unvalidated device data.

## 2019-12-26 ENCOUNTER — TELEPHONE (OUTPATIENT)
Dept: GYNECOLOGY | Age: 49
End: 2019-12-26

## 2019-12-26 NOTE — TELEPHONE ENCOUNTER
I called Ms. Kalyani Graham to discuss her benign pathology below. She is otherwise doing well after surgery and is thankful for our call. Roscoe Verde MD          * * *FINAL PATHOLOGIC DIAGNOSIS* * *     Soft tissue, vulva, left at 5:00, excision:       Mature adipose tissue consistent with lipoma.

## 2020-01-28 ENCOUNTER — OFFICE VISIT (OUTPATIENT)
Dept: GYNECOLOGY | Age: 50
End: 2020-01-28

## 2020-01-28 VITALS
DIASTOLIC BLOOD PRESSURE: 76 MMHG | WEIGHT: 145.6 LBS | HEART RATE: 60 BPM | BODY MASS INDEX: 22.85 KG/M2 | HEIGHT: 67 IN | SYSTOLIC BLOOD PRESSURE: 131 MMHG

## 2020-01-28 DIAGNOSIS — D17.30 LIPOMA OF SKIN: Primary | ICD-10-CM

## 2020-01-28 DIAGNOSIS — Z09 POSTOPERATIVE EXAMINATION: ICD-10-CM

## 2020-01-28 NOTE — PROGRESS NOTES
[address] [place] [address] Mathias Moritz [address], 6938 [facility] [phone]  F [phone]    Office Note  Patient ID:  Name:  Alan Salgado  MRN:  [id number]  :  1970/49 y.o. Date:  2020      HISTORY OF PRESENT ILLNESS:  Ms. Alan Salgado is a 47 y.o. female who on 19 Pelvic exam under anesthesia, resection of vulvar mass. Final pathology consistent with a lipoma. Presents back today for her postoperative visit. She is otherwise doing well. Initial History:  Ms. Alan Salgado is a 47 y.o.  postmenopausal female who presents in consultation from Dr. Corinne Brill for a left vulvar mass. The patient reports that about a month ago she noticed that her left vulva felt different at the time of shower. Denies pain in this area or skin changes. She ultimately presented to her dermatologist, who ultimately referred her to [facility], Dr. Corinne Brill. MRI on 19 is consistent with a \"3.5cm fat-containing mass, likely a lipoma\". She was ultimately referred to our office for further management. Overall she is doing well without complaints. Denies vaginal bleeding/discharge, abdominal/pelvic pain, nausea, vomiting, constipation, diarrhea, CP, SOB, hematuria, hematochezia, change in appetite or bowel movements, bloating, fevers, chills, or urinary symptoms. Pertinent PMH/PSH: n/a      Active, no restrictions. Pathology Review:   19:  FINAL PATHOLOGIC DIAGNOSIS   Soft tissue, vulva, left at 5:00, excision:   Mature adipose tissue consistent with lipoma. Imaging Review:   MRI 19: (from her other MRN; the charts will be merged in the future)  FINDINGS: The uterus measures 6.4 x 3.3 x 3.0 cm. Mildly hyperintense T2 fibroid  partially enhances in the anterior body myometrium and measures approximately  0.9 cm in diameter. No other definable fibroid. The endometrial stripe measures 0.4 cm. The junctional zone measures 0.5 cm. The  cervix is within normal limits. Vagina is within normal limits. Labia minora are heterogeneous. However, there is no definable cyst or mass. No  cyst or mass in the labia majora. In the deep, inferior left vulva, a lobulated,  septated, encapsulated fatty mass measures 3.5 x 1.7 x 2.3 cm. No pathologic  enhancement. The ovaries are atrophied. No adnexal mass or cyst.  Urinary bladder is partially distended. No urethral diverticulum. There is no free fluid. Bones are within normal limits. IMPRESSION  IMPRESSION:   1. Palpable mass in the deep left bulb represents a nonaggressive 3.5 cm  fat-containing mass, likely a lipoma. No suspicious imaging features to suggest  liposarcoma. If there is increase in size, consider repeat imaging or resection. 2. No enhancing mass or cyst.    ROS:  A comprehensive review of systems was negative except for that written in the History of Present Illness. , 10 point ROS    OB/GYN ROS:  Patient denies significant menstrual problems. Menopausal    ECOG ndGndrndanddndend:nd nd2nd Problem List:  Patient Active Problem List    Diagnosis Date Noted    Vulvar mass 2019    Lipoma of skin 2019     PMH:  Past Medical History:   Diagnosis Date    Ill-defined condition     DEPRESSION      PSH:  Past Surgical History:   Procedure Laterality Date    HX BREAST AUGMENTATION      HX BREAST BIOPSY Left     NEG.     HX GI  2016    COLONOSCOPY    HX HERNIA REPAIR Right       Social History:  Social History     Tobacco Use    Smoking status: Never Smoker    Smokeless tobacco: Never Used   Substance Use Topics    Alcohol use: Never     Frequency: Never      Family History:  Family History   Problem Relation Age of Onset    Heart Disease Mother     High Cholesterol Mother     Pancreatic Cancer Father         , occular melanoma    Colon Cancer Maternal Grandmother     Cancer Paternal Grandmother         unknown type    Breast Cancer Other         paternal cousin   [facility] Ovarian Cancer Other         paternal side of family    No Known Problems Sister       Medications: (reviewed)  Current Outpatient Medications   Medication Sig    Cetirizine (ZYRTEC) 10 mg cap Take  by mouth.  multivitamin (ONE A DAY) tablet Take 1 Tab by mouth daily.  glucosam/vera-msm1/C/sheri/bosw (OSTEO BI-FLEX TRIPLE STRENGTH PO) Take 1 Tab by mouth daily.  sofi phos/brindal berry (CITRIMAX PO) Take 1 Tab by mouth daily.  CINNAMON BARK-CHROMIUM-ALA PO Take 2,000 mg by mouth daily.  ACAI BERRY EXTRACT PO Take 3,000 mg by mouth daily.  magnesium 250 mg tab Take 1 Tab by mouth daily.  potassium 99 mg tablet Take 99 mg by mouth daily.  cyanocobalamin, vitamin B-12, (VITAMIN B-12) 5,000 mcg subl 1 Tab by SubLINGual route daily.  TAYTULLA 1 mg-20 mcg (24)/75 mg (4) cap TK 1 C PO D    glycopyrrolate (ROBINUL) 1 mg tablet TK 1 T PO QD PRN     No current facility-administered medications for this visit. Allergies: (reviewed)  No Known Allergies     Gyn History:   Last pap: reportedly normal last year. History of abnormal pap: yes, in 2010? - but normal since  Menses: post-menopausal    OBJECTIVE:  Physical Exam:  Visit Vitals  /76 (BP 1 Location: Left arm, BP Patient Position: Sitting)   Pulse 60   Ht 5' 6.5\" (1.689 m)   Wt 145 lb 9.6 oz (66 kg)   BMI 23.15 kg/m²      General: Alert and oriented. No acute distress. Well-nourished  Extremities: extremities normal, atraumatic, no cyanosis or edema. Pelvic: exam chaperoned by nurse. Normal appearing external genitalia. Her vulvar incision on the left is healing well without defect. Vaginal exam deferred. Neuro: Grossly intact. Normal gait and movement. No acute deficit  Skin: No evidence of rashes or skin changes. IMPRESSION/PLAN:    Ms. Maria De Jesus Price is a 47 y.o. female who on 12/20/19 Pelvic exam under anesthesia, resection of vulvar mass. Final pathology consistent with a lipoma.      Problems:     Patient Active Problem List    Diagnosis Date Noted    Vulvar mass 11/11/2019    Lipoma of skin 11/11/2019     Reviewed patient's course to date, including her benign surgical pathology. She has healed well. She may be discharged from Gynecologic Oncology clinic. She will return to Dr. Humaira Orlando for routine gynecologic care. All questions and concerns were addressed with the patient and she is comfortable with the plan.      Brad Knapp MD

## 2020-01-28 NOTE — PROGRESS NOTES
Post op Visit, surgery was on 12/20/2019    1. Have you been to the ER, urgent care clinic since your last visit? Hospitalized since your last visit?  no    2. Have you seen or consulted any other health care providers outside of the [address] [place] [address] since your last visit? Include any pap smears or colon screening.    no

## 2022-02-04 ENCOUNTER — HOSPITAL ENCOUNTER (OUTPATIENT)
Age: 52
Setting detail: OUTPATIENT SURGERY
Discharge: HOME | End: 2022-02-04
Attending: INTERNAL MEDICINE | Admitting: INTERNAL MEDICINE
Payer: COMMERCIAL

## 2022-02-04 ENCOUNTER — ANESTHESIA EVENT (OUTPATIENT)
Dept: ENDOSCOPY | Age: 52
End: 2022-02-04
Payer: COMMERCIAL

## 2022-02-04 ENCOUNTER — ANESTHESIA (OUTPATIENT)
Dept: ENDOSCOPY | Age: 52
End: 2022-02-04
Payer: COMMERCIAL

## 2022-02-04 VITALS
HEART RATE: 75 BPM | HEIGHT: 66 IN | TEMPERATURE: 97 F | BODY MASS INDEX: 21.69 KG/M2 | RESPIRATION RATE: 16 BRPM | WEIGHT: 135 LBS | DIASTOLIC BLOOD PRESSURE: 69 MMHG | OXYGEN SATURATION: 100 % | SYSTOLIC BLOOD PRESSURE: 114 MMHG

## 2022-02-04 LAB
COVID-19 RAPID TEST, COVR: NOT DETECTED
SOURCE, COVRS: NORMAL

## 2022-02-04 PROCEDURE — 74011000250 HC RX REV CODE- 250: Performed by: NURSE ANESTHETIST, CERTIFIED REGISTERED

## 2022-02-04 PROCEDURE — 74011250636 HC RX REV CODE- 250/636: Performed by: NURSE ANESTHETIST, CERTIFIED REGISTERED

## 2022-02-04 PROCEDURE — 76060000032 HC ANESTHESIA 0.5 TO 1 HR: Performed by: INTERNAL MEDICINE

## 2022-02-04 PROCEDURE — 77030013996 HC SNR POLYP ENDOSC OCOA -B: Performed by: INTERNAL MEDICINE

## 2022-02-04 PROCEDURE — 76040000007: Performed by: INTERNAL MEDICINE

## 2022-02-04 PROCEDURE — 88305 TISSUE EXAM BY PATHOLOGIST: CPT

## 2022-02-04 PROCEDURE — 2709999900 HC NON-CHARGEABLE SUPPLY: Performed by: INTERNAL MEDICINE

## 2022-02-04 PROCEDURE — 87635 SARS-COV-2 COVID-19 AMP PRB: CPT

## 2022-02-04 PROCEDURE — 74011250637 HC RX REV CODE- 250/637: Performed by: INTERNAL MEDICINE

## 2022-02-04 RX ORDER — EPINEPHRINE 0.1 MG/ML
1 INJECTION INTRACARDIAC; INTRAVENOUS
Status: DISCONTINUED | OUTPATIENT
Start: 2022-02-04 | End: 2022-02-04 | Stop reason: HOSPADM

## 2022-02-04 RX ORDER — FLUMAZENIL 0.1 MG/ML
0.2 INJECTION INTRAVENOUS
Status: DISCONTINUED | OUTPATIENT
Start: 2022-02-04 | End: 2022-02-04 | Stop reason: HOSPADM

## 2022-02-04 RX ORDER — FENTANYL CITRATE 50 UG/ML
100 INJECTION, SOLUTION INTRAMUSCULAR; INTRAVENOUS
Status: DISCONTINUED | OUTPATIENT
Start: 2022-02-04 | End: 2022-02-04 | Stop reason: HOSPADM

## 2022-02-04 RX ORDER — LIDOCAINE HYDROCHLORIDE 20 MG/ML
INJECTION, SOLUTION EPIDURAL; INFILTRATION; INTRACAUDAL; PERINEURAL AS NEEDED
Status: DISCONTINUED | OUTPATIENT
Start: 2022-02-04 | End: 2022-02-04 | Stop reason: HOSPADM

## 2022-02-04 RX ORDER — DEXTROMETHORPHAN/PSEUDOEPHED 2.5-7.5/.8
1.2 DROPS ORAL
Status: DISCONTINUED | OUTPATIENT
Start: 2022-02-04 | End: 2022-02-04 | Stop reason: HOSPADM

## 2022-02-04 RX ORDER — NALOXONE HYDROCHLORIDE 0.4 MG/ML
0.4 INJECTION, SOLUTION INTRAMUSCULAR; INTRAVENOUS; SUBCUTANEOUS
Status: DISCONTINUED | OUTPATIENT
Start: 2022-02-04 | End: 2022-02-04 | Stop reason: HOSPADM

## 2022-02-04 RX ORDER — GLYCOPYRROLATE 0.2 MG/ML
INJECTION INTRAMUSCULAR; INTRAVENOUS AS NEEDED
Status: DISCONTINUED | OUTPATIENT
Start: 2022-02-04 | End: 2022-02-04 | Stop reason: HOSPADM

## 2022-02-04 RX ORDER — MIDAZOLAM HYDROCHLORIDE 1 MG/ML
.25-1 INJECTION, SOLUTION INTRAMUSCULAR; INTRAVENOUS
Status: DISCONTINUED | OUTPATIENT
Start: 2022-02-04 | End: 2022-02-04 | Stop reason: HOSPADM

## 2022-02-04 RX ORDER — PROPOFOL 10 MG/ML
INJECTION, EMULSION INTRAVENOUS AS NEEDED
Status: DISCONTINUED | OUTPATIENT
Start: 2022-02-04 | End: 2022-02-04 | Stop reason: HOSPADM

## 2022-02-04 RX ORDER — SODIUM CHLORIDE 0.9 % (FLUSH) 0.9 %
5-40 SYRINGE (ML) INJECTION AS NEEDED
Status: DISCONTINUED | OUTPATIENT
Start: 2022-02-04 | End: 2022-02-04 | Stop reason: HOSPADM

## 2022-02-04 RX ORDER — SODIUM CHLORIDE 9 MG/ML
50 INJECTION, SOLUTION INTRAVENOUS CONTINUOUS
Status: DISCONTINUED | OUTPATIENT
Start: 2022-02-04 | End: 2022-02-04 | Stop reason: HOSPADM

## 2022-02-04 RX ORDER — ESTRADIOL AND NORETHINDRONE ACETATE .5; .1 MG/1; MG/1
1 TABLET ORAL DAILY
COMMUNITY

## 2022-02-04 RX ORDER — SODIUM CHLORIDE 0.9 % (FLUSH) 0.9 %
5-40 SYRINGE (ML) INJECTION EVERY 8 HOURS
Status: DISCONTINUED | OUTPATIENT
Start: 2022-02-04 | End: 2022-02-04 | Stop reason: HOSPADM

## 2022-02-04 RX ORDER — SODIUM CHLORIDE 9 MG/ML
INJECTION, SOLUTION INTRAVENOUS
Status: DISCONTINUED | OUTPATIENT
Start: 2022-02-04 | End: 2022-02-04 | Stop reason: HOSPADM

## 2022-02-04 RX ORDER — ATROPINE SULFATE 0.1 MG/ML
0.5 INJECTION INTRAVENOUS
Status: DISCONTINUED | OUTPATIENT
Start: 2022-02-04 | End: 2022-02-04 | Stop reason: HOSPADM

## 2022-02-04 RX ADMIN — PROPOFOL 50 MG: 10 INJECTION, EMULSION INTRAVENOUS at 15:43

## 2022-02-04 RX ADMIN — PROPOFOL 75 MG: 10 INJECTION, EMULSION INTRAVENOUS at 15:39

## 2022-02-04 RX ADMIN — PROPOFOL 25 MG: 10 INJECTION, EMULSION INTRAVENOUS at 15:40

## 2022-02-04 RX ADMIN — PROPOFOL 25 MG: 10 INJECTION, EMULSION INTRAVENOUS at 16:06

## 2022-02-04 RX ADMIN — PROPOFOL 25 MG: 10 INJECTION, EMULSION INTRAVENOUS at 15:47

## 2022-02-04 RX ADMIN — PROPOFOL 25 MG: 10 INJECTION, EMULSION INTRAVENOUS at 15:51

## 2022-02-04 RX ADMIN — PROPOFOL 25 MG: 10 INJECTION, EMULSION INTRAVENOUS at 16:00

## 2022-02-04 RX ADMIN — PROPOFOL 50 MG: 10 INJECTION, EMULSION INTRAVENOUS at 15:48

## 2022-02-04 RX ADMIN — PROPOFOL 25 MG: 10 INJECTION, EMULSION INTRAVENOUS at 15:49

## 2022-02-04 RX ADMIN — PROPOFOL 25 MG: 10 INJECTION, EMULSION INTRAVENOUS at 15:45

## 2022-02-04 RX ADMIN — LIDOCAINE HYDROCHLORIDE 80 MG: 20 INJECTION, SOLUTION EPIDURAL; INFILTRATION; INTRACAUDAL; PERINEURAL at 15:39

## 2022-02-04 RX ADMIN — PROPOFOL 25 MG: 10 INJECTION, EMULSION INTRAVENOUS at 15:53

## 2022-02-04 RX ADMIN — PROPOFOL 25 MG: 10 INJECTION, EMULSION INTRAVENOUS at 16:03

## 2022-02-04 RX ADMIN — PROPOFOL 25 MG: 10 INJECTION, EMULSION INTRAVENOUS at 15:57

## 2022-02-04 RX ADMIN — GLYCOPYRROLATE 0.2 MG: 0.2 INJECTION, SOLUTION INTRAMUSCULAR; INTRAVENOUS at 15:49

## 2022-02-04 RX ADMIN — PROPOFOL 25 MG: 10 INJECTION, EMULSION INTRAVENOUS at 15:55

## 2022-02-04 RX ADMIN — SODIUM CHLORIDE: 900 INJECTION, SOLUTION INTRAVENOUS at 15:32

## 2022-02-04 NOTE — DISCHARGE INSTRUCTIONS
[address] [facility].  217 [facility] [address] [facility], [address]  [address] [facility] [address] Chato Garcia  [phone]  1970    It was my pleasure seeing you for your procedure. You will also receive a summary report with the findings from this procedure and any further recommendations. If you had polyps removed or biopsies taken during your procedure, you will receive a separate letter from me within the next 2 weeks. If you don't receive this letter or if you have any questions, please call my office [phone]. Please take note of the post procedure instructions listed below. Best Wishes,    Dr. Ivis Mcfarlane    These instructions give you information on caring for yourself after your procedure. Call your doctor if you have any problems or questions after your procedure. HOME CARE  · Walk if you have belly cramping or gas. Walking will help get rid of the air and reduce the bloated feeling in your belly (abdomen). · Your IV site (where you received drugs) may be tender to touch. Place warm towels on the site; keep your arm up on two pillows if you have any swelling or soreness in the area. · You may shower. ACTIVITY:  · Take frequent rest periods and move at a slower pace for the next 24 hours. .  · You may resume your regular activity tomorrow if you are feeling back to normal.  · Do not drive or ride a bicycle for at least 24 hours (because of the medicine (anesthesia) used during the test). · Do not sign any important legal documents or use or operate any machinery for 24 hours  · Do not take sleeping medicines/nerve drugs for 24 hours unless the doctor tells you. · You can return to work/school tomorrow unless otherwise instructed. NUTRITION:  · Drink plenty of fluids to keep your pee (urine) clear or pale yellow  · Begin with a light meal and progress to your normal diet.  Heavy or fried foods are harder to digest and may make you feel sick to your stomach (nauseated). · Once you are feeling back to normal, you may resume your normal diet as instructed by your doctor. · Avoid alcoholic beverages for 24 hours or as instructed. IF YOU HAD BIOPSIES TAKEN OR POLYPS REMOVED DURING THE PROCEDURE:  · For the next 7 days, avoid all non-steroidal antiinflammatory medications such as Ibuprofen, Motrin, Advil, Alleve, Nhung-seltzer, Goody's powder, BC powder. · If you do not have an heart condition that requires you to take a daily aspirin, you should avoid taking aspirin for 7 days. · Eat a soft diet for 24 hours. · Monitor your stools for any blood or dark black, tar-like, stools as this may be a sign of bleeding and if you see any blood, notify your doctor immediately. GET HELP RIGHT AWAY AND SEEK IMMEDIATE MEDICAL CARE IF:  · You have more than a spotting of blood in your stool. · You pass clumps of tissue (blood clots) or fill the toilet with blood. · Your belly is painfully swollen or puffy (abdominal distention). · You throw up (vomit). · You have a fever. · You have redness, pain or swelling at the IV site that last greater than two days. · You have abdominal pain or discomfort that is severe or gets worse throughout the day. Post-procedure diagnosis:  colon polyps    Post-procedure recommendations:    Findings:   Rectum: normal  Sigmoid: moderate melanosis, small amount semi-liquid stool   Descending Colon: moderate melanosis, small amount semi-liquid stool   Transverse Colon: moderate melanosis, small amount semi-liquid stool, one sessile polyp (8 mm), removed with cold snare, one 10 mm sessile polyp, removed with hot snare  Ascending Colon: moderate melanosis, small amount semi-liquid stool, one 12 mm flat polyp, removed with hot snare  Cecum: moderate melanosis, small amount semi-liquid stool. Recommendations:   - Await pathology. You should receive a letter within 2 weeks.    - Resume normal medications.  - Recommend repeat colonoscopy in 1 year with double prep. Learning About Coronavirus [phone])  Coronavirus [phone]): Overview  What is coronavirus (COVID-19)? The coronavirus disease (COVID-19) is caused by a virus. It is an illness that was first found in [place], [place], in December 2019. It has since spread worldwide. The virus can cause fever, cough, and trouble breathing. In severe cases, it can cause pneumonia and make it hard to breathe without help. It can cause death. Coronaviruses are a large group of viruses. They cause the common cold. They also cause more serious illnesses like Middle East respiratory syndrome (MERS) and severe acute respiratory syndrome (SARS). COVID-19 is caused by a novel coronavirus. That means it's a new type that has not been seen in people before. This virus spreads person-to-person through droplets from coughing and sneezing. It can also spread when you are close to someone who is infected. And it can spread when you touch something that has the virus on it, such as a doorknob or a tabletop. What can you do to protect yourself from coronavirus (COVID-19)? The best way to protect yourself from getting sick is to:  · Avoid areas where there is an outbreak. · Avoid contact with people who may be infected. · Wash your hands often with soap or alcohol-based hand sanitizers. · Avoid crowds and try to stay at least 6 feet away from other people. · Wash your hands often, especially after you cough or sneeze. Use soap and water, and scrub for at least 20 seconds. If soap and water aren't available, use an alcohol-based hand . · Avoid touching your mouth, nose, and eyes. What can you do to avoid spreading the virus to others? To help avoid spreading the virus to others:  · Cover your mouth with a tissue when you cough or sneeze. Then throw the tissue in the trash. · Use a disinfectant to clean things that you touch often.   · Stay home if you are sick or have been exposed to the virus. Don't go to school, work, or public areas. And don't use public transportation. · If you are sick:  ? Leave your home only if you need to get medical care. But call the doctor's office first so they know you're coming. And wear a face mask, if you have one.  ? If you have a face mask, wear it whenever you're around other people. It can help stop the spread of the virus when you cough or sneeze. ? Clean and disinfect your home every day. Use household  and disinfectant wipes or sprays. Take special care to clean things that you grab with your hands. These include doorknobs, remote controls, phones, and handles on your refrigerator and microwave. And don't forget countertops, tabletops, bathrooms, and computer keyboards. When to call for help  Call 911 anytime you think you may need emergency care. For example, call if:  · You have severe trouble breathing. (You can't talk at all.)  · You have constant chest pain or pressure. · You are severely dizzy or lightheaded. · You are confused or can't think clearly. · Your face and lips have a blue color. · You pass out (lose consciousness) or are very hard to wake up. Call your doctor now if you develop symptoms such as:  · Shortness of breath. · Fever. · Cough. If you need to get care, call ahead to the doctor's office for instructions before you go. Make sure you wear a face mask, if you have one, to prevent exposing other people to the virus. Where can you get the latest information? The following health organizations are tracking and studying this virus. Their websites contain the most up-to-date information. Leland Lose also learn what to do if you think you may have been exposed to the virus. · U.S. Centers for Disease Control and Prevention (CDC): The CDC provides updated news about the disease and travel advice. The website also tells you how to prevent the spread of infection.  www.cdc.gov  · [address] Organization (WHO): WHO offers information about the virus outbreaks. WHO also has travel advice. www.who.int  Current as of: April 1, 2020               Content Version: 12.4  © 5585-9460 Healthwise, Incorporated. Care instructions adapted under license by your healthcare professional. If you have questions about a medical condition or this instruction, always ask your healthcare professional. [address] any warranty or liability for your use of this information.

## 2022-02-04 NOTE — ANESTHESIA PREPROCEDURE EVALUATION
Relevant Problems   No relevant active problems       Anesthetic History   No history of anesthetic complications            Review of Systems / Medical History  Patient summary reviewed, nursing notes reviewed and pertinent labs reviewed    Pulmonary  Within defined limits                 Neuro/Psych   Within defined limits           Cardiovascular                  Exercise tolerance: >4 METS     GI/Hepatic/Renal                Endo/Other             Other Findings              Physical Exam    Airway  Mallampati: I  TM Distance: > 6 cm  Neck ROM: normal range of motion   Mouth opening: Normal     Cardiovascular    Rhythm: regular           Dental  No notable dental hx       Pulmonary  Breath sounds clear to auscultation               Abdominal         Other Findings            Anesthetic Plan    ASA: 1  Anesthesia type: MAC          Induction: Intravenous  Anesthetic plan and risks discussed with: Patient

## 2022-02-04 NOTE — H&P
118 [facility].  [address]  1400 [facility], [address]   [phone]                                History and Physical     NAME: Meme Mena   :  1970   MRN:  [id number]     HPI:  The patient was seen and examined. Past Surgical History:   Procedure Laterality Date    HX BREAST AUGMENTATION      HX BREAST BIOPSY Left     NEG.  HX GI  2016    COLONOSCOPY    HX HERNIA REPAIR Right 1998     Past Medical History:   Diagnosis Date    Ill-defined condition     DEPRESSION     Social History     Tobacco Use    Smoking status: Never Smoker    Smokeless tobacco: Never Used   Substance Use Topics    Alcohol use: Never    Drug use: Never     No Known Allergies  Family History   Problem Relation Age of Onset    Heart Disease Mother     High Cholesterol Mother     Pancreatic Cancer Father         , occular melanoma    Colon Cancer Maternal Grandmother     Cancer Paternal Grandmother         unknown type    Breast Cancer Other         paternal cousin    Ovarian Cancer Other         paternal side of family    No Known Problems Sister      No current facility-administered medications for this encounter. Facility-Administered Medications Ordered in Other Encounters   Medication Dose Route Frequency    0.9% sodium chloride infusion   IntraVENous CONTINUOUS         PHYSICAL EXAM:  General: WD, WN. Alert, cooperative, no acute distress    HEENT: NC, Atraumatic. PERRLA, EOMI. Anicteric sclerae. Lungs:  CTA Bilaterally. No Wheezing/Rhonchi/Rales. Heart:  Regular  rhythm,  No murmur, No Rubs, No Gallops  Abdomen: Soft, Non distended, Non tender. +Bowel sounds, no HSM  Extremities: No c/c/e  Neurologic:  CN 2-12 gi, Alert and oriented X 3. No acute neurological distress   Psych:   Good insight. Not anxious nor agitated. The heart, lungs and mental status were satisfactory for the administration of MAC sedation and for the procedure.       Mallampati score: 2     The patient was counseled at length about the risks of mani Covid-19 in the dagoberto-operative and post-operative states including the recovery window of their procedure. The patient was made aware that mani Covid-19 after a surgical procedure may worsen their prognosis for recovering from the virus and lend to a higher morbidity and or mortality risk. The patient was given the options of postponing their procedure. All of the risks, benefits, and alternatives were discussed. The patient does wish to proceed with the procedure.       Assessment:   · screening    Plan:   · Endoscopic procedure :Colonocopy  · MAC sedation

## 2022-02-04 NOTE — PERIOP NOTES

## 2022-02-04 NOTE — PROCEDURES
118 [facility].  217 [facility] [address], [address]  [phone]                              Colonoscopy Procedure Note      Indications:  Screening, history colon polyps     :  Brook Smiley MD    Staff: Endoscopy Technician-1: Davin Buckner  Endoscopy RN-1: Bradley Pitts RN    Referring Provider: Nubia Merrill MD    Sedation:  MAC anesthesia    Procedure Details:  After informed consent was obtained with all risks and benefits of procedure explained and preoperative exam completed, the patient was taken to the endoscopy suite and placed in the left lateral decubitus position. Upon sequential sedation as per above, a digital rectal exam was performed per below. The Olympus videocolonoscope was inserted in the rectum and carefully advanced to the cecum, which was identified by the ileocecal valve and appendiceal orifice. The quality of preparation was fair to poor. [place] Bowel Prep Score : 1/1/2 . The colonoscope was slowly withdrawn with careful evaluation between folds. Retroflexion in the rectum was performed. Findings:   Rectum: normal  Sigmoid: moderate melanosis, small amount semi-liquid stool   Descending Colon: moderate melanosis, small amount semi-liquid stool   Transverse Colon: moderate melanosis, small amount semi-liquid stool, one sessile polyp (8 mm), removed with cold snare, one 10 mm sessile polyp, removed with hot snare  Ascending Colon: moderate melanosis, small amount semi-liquid stool, one 12 mm flat polyp, removed with hot snare  Cecum: moderate melanosis, small amount semi-liquid stool. Interventions:  Polypectomy     Specimen Removed:    ID Type Source Tests Collected by Time Destination   1 : Ascending colon polyp Preservative Colon, Ascending  Kaylynn Bermeo MD 2/4/2022 1603 Pathology   2 : Transverse colon polyps Preservative Colon, Transverse  Kaylynn Bermeo MD 2/4/2022 1604 Pathology       Complications: None.      EBL: Minimal     Impression:    See Postoperative diagnosis above    Recommendations:   - Await pathology. You should receive a letter within 2 weeks. - Resume normal medications.  - Recommend repeat colonoscopy in 1 year with double prep. Discharge Disposition:  Home in the company of a  when able to ambulate.     Astrid Cheek MD  2/4/2022  4:12 PM

## 2022-02-07 NOTE — ANESTHESIA POSTPROCEDURE EVALUATION
Post-Anesthesia Evaluation and Assessment    Patient: Heather Mcintosh MRN: [id number]  SSN: [id number]    YOB: 1970  Age: 45 y.o. Sex: female       Cardiovascular Function/Vital Signs  Visit Vitals  /69   Pulse 75   Temp 36.1 °C (97 °F)   Resp 16   Ht 5' 6\" (1.676 m)   Wt 61.2 kg (135 lb)   SpO2 100%   Breastfeeding No   BMI 21.79 kg/m²       Patient is status post MAC anesthesia for Procedure(s):  COLONOSCOPY (Will need rapid)  ENDOSCOPIC POLYPECTOMY. Nausea/Vomiting: None    Postoperative hydration reviewed and adequate. Pain:  Pain Scale 1: Numeric (0 - 10) (02/04/22 1625)  Pain Intensity 1: 0 (02/04/22 1625)   Managed    Neurological Status: At baseline    Mental Status and Level of Consciousness: Alert and oriented to person, place, and time    Pulmonary Status:   O2 Device: None (Room air) (02/04/22 1625)   Adequate oxygenation and airway patent    Complications related to anesthesia: None    Post-anesthesia assessment completed. No concerns    Signed By: Rafal Cage MD     February 7, 2022              Procedure(s):  COLONOSCOPY (Will need rapid)  ENDOSCOPIC POLYPECTOMY.     MAC    <BSHSIANPOST>    INITIAL Post-op Vital signs:   Vitals Value Taken Time   /69 02/04/22 1625   Temp 36.1 °C (97 °F) 02/04/22 1616   Pulse 75 02/04/22 1625   Resp 16 02/04/22 1625   SpO2 100 % 02/04/22 1625

## 2022-03-18 PROBLEM — N90.89 VULVAR MASS: Status: ACTIVE | Noted: 2019-11-11

## 2022-03-19 PROBLEM — D17.30 LIPOMA OF SKIN: Status: ACTIVE | Noted: 2019-11-11

## 2022-12-26 ENCOUNTER — OFFICE VISIT (OUTPATIENT)
Dept: URGENT CARE | Age: 52
End: 2022-12-26
Payer: COMMERCIAL

## 2022-12-26 VITALS
SYSTOLIC BLOOD PRESSURE: 143 MMHG | RESPIRATION RATE: 15 BRPM | HEART RATE: 71 BPM | WEIGHT: 139 LBS | TEMPERATURE: 98.1 F | BODY MASS INDEX: 22.44 KG/M2 | OXYGEN SATURATION: 100 % | DIASTOLIC BLOOD PRESSURE: 81 MMHG

## 2022-12-26 DIAGNOSIS — Z20.822 SUSPECTED COVID-19 VIRUS INFECTION: Primary | ICD-10-CM

## 2022-12-26 DIAGNOSIS — H93.8X3 CONGESTION OF BOTH EARS: ICD-10-CM

## 2022-12-26 DIAGNOSIS — U07.1 COVID-19 VIRUS INFECTION: ICD-10-CM

## 2022-12-26 LAB — SARS-COV-2 PCR, POC: POSITIVE

## 2022-12-26 PROCEDURE — 87635 SARS-COV-2 COVID-19 AMP PRB: CPT | Performed by: FAMILY MEDICINE

## 2022-12-26 PROCEDURE — 99202 OFFICE O/P NEW SF 15 MIN: CPT | Performed by: FAMILY MEDICINE

## 2022-12-26 RX ORDER — AMOXICILLIN 875 MG/1
875 TABLET, FILM COATED ORAL 2 TIMES DAILY
Qty: 20 TABLET | Refills: 0 | Status: SHIPPED | OUTPATIENT
Start: 2022-12-26 | End: 2023-01-05

## 2022-12-26 NOTE — PATIENT INSTRUCTIONS
Take Mucinex Fast max day/night   Zyrtec/ allegra   Flonase  Motrin    Gargles and sinus rinse       If not better use Amox

## 2022-12-26 NOTE — PROGRESS NOTES
Nasal Congestion  This is a new problem. The current episode started 2 days ago. The problem occurs constantly. The problem has been gradually worsening. Associated symptoms include headaches. Associated symptoms comments: Throat pain / ear congestion and pain   Pressure in ear   No fever. Chills- some cough    No covid/ flu exposure . Nothing aggravates the symptoms. Nothing relieves the symptoms. She has tried nothing for the symptoms. Past Medical History:   Diagnosis Date    Ill-defined condition     DEPRESSION        Past Surgical History:   Procedure Laterality Date    COLONOSCOPY N/A 2022    COLONOSCOPY (Will need rapid) performed by Cruz Cruz MD at [facility] ENDOSCOPY    HX BREAST AUGMENTATION      HX BREAST BIOPSY Left     NEG.     HX GI  2016    COLONOSCOPY    HX HERNIA REPAIR Right          Family History   Problem Relation Age of Onset    Heart Disease Mother     High Cholesterol Mother     Pancreatic Cancer Father         , occular melanoma    Colon Cancer Maternal Grandmother     Cancer Paternal Grandmother         unknown type    Breast Cancer Other         paternal cousin    Ovarian Cancer Other         paternal side of family    No Known Problems Sister         Social History     Socioeconomic History    Marital status:      Spouse name: Not on file    Number of children: Not on file    Years of education: Not on file    Highest education level: Not on file   Occupational History    Not on file   Tobacco Use    Smoking status: Never    Smokeless tobacco: Never   Substance and Sexual Activity    Alcohol use: Never    Drug use: Never    Sexual activity: Not on file   Other Topics Concern    Not on file   Social History Narrative    Not on file     Social Determinants of Health     Financial Resource Strain: Not on file   Food Insecurity: Not on file   Transportation Needs: Not on file   Physical Activity: Not on file   Stress: Not on file   Social Connections: Not on file   Intimate Partner Violence: Not on file   Housing Stability: Not on file                ALLERGIES: Patient has no known allergies. Review of Systems   HENT:  Positive for congestion and ear pain. Respiratory:  Positive for cough. Neurological:  Positive for headaches. All other systems reviewed and are negative. Vitals:    12/26/22 0929   BP: (!) 143/81   Pulse: 71   Resp: 15   Temp: 98.1 °F (36.7 °C)   SpO2: 100%   Weight: 139 lb (63 kg)       Physical Exam  Vitals and nursing note reviewed. Constitutional:       General: She is not in acute distress. HENT:      Right Ear: Tympanic membrane and ear canal normal.      Left Ear: Tympanic membrane and ear canal normal.      Nose: Nose normal.      Mouth/Throat:      Pharynx: No oropharyngeal exudate or posterior oropharyngeal erythema. Eyes:      General:         Right eye: No discharge. Left eye: No discharge. Conjunctiva/sclera: Conjunctivae normal.   Pulmonary:      Effort: Pulmonary effort is normal. No respiratory distress. Breath sounds: Normal breath sounds. No wheezing, rhonchi or rales. Musculoskeletal:      Cervical back: Neck supple. Lymphadenopathy:      Cervical: No cervical adenopathy. Skin:     Findings: No rash. MDM    Procedures      ICD-10-CM ICD-9-CM    1. Suspected COVID-19 virus infection  Z20.822 V01.79 POCT COVID-19, SARS-COV-2, PCR      2. Congestion of both ears  H93.8X3 388.8       3. COVID-19 virus infection  U07.1 079.89 nirmatrelvir-ritonavir (Paxlovid, EUA,) 300 mg (150 mg x 2)-100 mg        Take Mucinex Fast max day/night   Zyrtec/ allegra   Flonase  Motrin    Gargles and sinus rinse       Use amoxicillin if worsen       Medications Ordered Today   Medications    amoxicillin (AMOXIL) 875 mg tablet     Sig: Take 1 Tablet by mouth two (2) times a day for 10 days.      Dispense:  20 Tablet     Refill:  0    nirmatrelvir-ritonavir (Paxlovid, EUA,) 300 mg (150 mg x 2)-100 mg     Sig: Use as directed  Indications: COVID-19 (emergency use authorization)     Dispense:  1 Box     Refill:  0     Order Specific Question:   Does this patient qualify for COVID-19 antiviral treatment based on criteria for treatment? Answer:   Yes     Results for orders placed or performed in visit on 12/26/22   POCT COVID-19, SARS-COV-2, PCR   Result Value Ref Range    SARS-COV-2 PCR, POC Positive (A) Negative     The patients condition was discussed with the patient and they understand. The patient is to follow up with primary care doctor. If signs and symptoms become worse the pt is to go to the ER. The patient is to take medications as prescribed.

## 2022-12-26 NOTE — LETTER
[address] [facility]  [address]  [facility] [address]    Work/School Note    Date: 12/26/2022    To Whom It May concern:    Cresencio Lin was seen and treated today in the urgent care center by the following provider(s):  No providers found. Cresencio Lin may return to work on 1/2/23.     Sincerely,          January Cardenas MD

## 2023-05-25 RX ORDER — ESTRADIOL AND NORETHINDRONE ACETATE .5; .1 MG/1; MG/1
1 TABLET ORAL DAILY
COMMUNITY

## 2023-05-25 RX ORDER — AMOXICILLIN 250 MG
1 CAPSULE ORAL DAILY
COMMUNITY

## 2023-05-25 RX ORDER — GLYCOPYRROLATE 1 MG/1
TABLET ORAL
COMMUNITY
Start: 2019-11-06

## 2023-11-10 ENCOUNTER — ANESTHESIA EVENT (OUTPATIENT)
Facility: HOSPITAL | Age: 53
End: 2023-11-10
Payer: COMMERCIAL

## 2023-11-10 ENCOUNTER — ANESTHESIA (OUTPATIENT)
Facility: HOSPITAL | Age: 53
End: 2023-11-10
Payer: COMMERCIAL

## 2023-11-10 ENCOUNTER — HOSPITAL ENCOUNTER (OUTPATIENT)
Facility: HOSPITAL | Age: 53
Setting detail: OUTPATIENT SURGERY
Discharge: HOME | End: 2023-11-10
Attending: INTERNAL MEDICINE | Admitting: INTERNAL MEDICINE
Payer: COMMERCIAL

## 2023-11-10 VITALS
RESPIRATION RATE: 13 BRPM | SYSTOLIC BLOOD PRESSURE: 128 MMHG | DIASTOLIC BLOOD PRESSURE: 69 MMHG | HEART RATE: 52 BPM | TEMPERATURE: 98 F | OXYGEN SATURATION: 100 %

## 2023-11-10 PROCEDURE — 3600007501: Performed by: INTERNAL MEDICINE

## 2023-11-10 PROCEDURE — 7100000011 HC PHASE II RECOVERY - ADDTL 15 MIN: Performed by: INTERNAL MEDICINE

## 2023-11-10 PROCEDURE — 6370000000 HC RX 637 (ALT 250 FOR IP): Performed by: INTERNAL MEDICINE

## 2023-11-10 PROCEDURE — 3700000000 HC ANESTHESIA ATTENDED CARE: Performed by: INTERNAL MEDICINE

## 2023-11-10 PROCEDURE — 3700000001 HC ADD 15 MINUTES (ANESTHESIA): Performed by: INTERNAL MEDICINE

## 2023-11-10 PROCEDURE — 88305 TISSUE EXAM BY PATHOLOGIST: CPT

## 2023-11-10 PROCEDURE — 2500000003 HC RX 250 WO HCPCS: Performed by: NURSE ANESTHETIST, CERTIFIED REGISTERED

## 2023-11-10 PROCEDURE — C1713 ANCHOR/SCREW BN/BN,TIS/BN: HCPCS | Performed by: INTERNAL MEDICINE

## 2023-11-10 PROCEDURE — 3600007511: Performed by: INTERNAL MEDICINE

## 2023-11-10 PROCEDURE — 6360000002 HC RX W HCPCS: Performed by: NURSE ANESTHETIST, CERTIFIED REGISTERED

## 2023-11-10 PROCEDURE — 2580000003 HC RX 258: Performed by: NURSE ANESTHETIST, CERTIFIED REGISTERED

## 2023-11-10 PROCEDURE — C1889 IMPLANT/INSERT DEVICE, NOC: HCPCS | Performed by: INTERNAL MEDICINE

## 2023-11-10 PROCEDURE — 2709999900 HC NON-CHARGEABLE SUPPLY: Performed by: INTERNAL MEDICINE

## 2023-11-10 PROCEDURE — 7100000010 HC PHASE II RECOVERY - FIRST 15 MIN: Performed by: INTERNAL MEDICINE

## 2023-11-10 DEVICE — WORKING LENGTH 235CM, WORKING CHANNEL 2.8MM
Type: IMPLANTABLE DEVICE | Site: ASCENDING COLON | Status: FUNCTIONAL
Brand: RESOLUTION 360 CLIP

## 2023-11-10 DEVICE — CLIP
Type: IMPLANTABLE DEVICE | Site: ASCENDING COLON | Status: FUNCTIONAL
Brand: RESOLUTION 360™ ULTRA CLIP

## 2023-11-10 RX ORDER — SODIUM CHLORIDE 0.9 % (FLUSH) 0.9 %
5-40 SYRINGE (ML) INJECTION EVERY 12 HOURS SCHEDULED
Status: DISCONTINUED | OUTPATIENT
Start: 2023-11-10 | End: 2023-11-10 | Stop reason: HOSPADM

## 2023-11-10 RX ORDER — LIDOCAINE HYDROCHLORIDE 20 MG/ML
INJECTION, SOLUTION EPIDURAL; INFILTRATION; INTRACAUDAL; PERINEURAL PRN
Status: DISCONTINUED | OUTPATIENT
Start: 2023-11-10 | End: 2023-11-10 | Stop reason: SDUPTHER

## 2023-11-10 RX ORDER — SODIUM CHLORIDE 9 MG/ML
INJECTION, SOLUTION INTRAVENOUS CONTINUOUS
Status: DISCONTINUED | OUTPATIENT
Start: 2023-11-10 | End: 2023-11-10 | Stop reason: HOSPADM

## 2023-11-10 RX ORDER — SIMETHICONE 20 MG/.3ML
EMULSION ORAL PRN
Status: DISCONTINUED | OUTPATIENT
Start: 2023-11-10 | End: 2023-11-10 | Stop reason: ALTCHOICE

## 2023-11-10 RX ORDER — SODIUM CHLORIDE, SODIUM LACTATE, POTASSIUM CHLORIDE, CALCIUM CHLORIDE 600; 310; 30; 20 MG/100ML; MG/100ML; MG/100ML; MG/100ML
INJECTION, SOLUTION INTRAVENOUS CONTINUOUS PRN
Status: DISCONTINUED | OUTPATIENT
Start: 2023-11-10 | End: 2023-11-10 | Stop reason: SDUPTHER

## 2023-11-10 RX ORDER — SODIUM CHLORIDE 0.9 % (FLUSH) 0.9 %
5-40 SYRINGE (ML) INJECTION PRN
Status: DISCONTINUED | OUTPATIENT
Start: 2023-11-10 | End: 2023-11-10 | Stop reason: HOSPADM

## 2023-11-10 RX ORDER — SODIUM CHLORIDE 9 MG/ML
25 INJECTION, SOLUTION INTRAVENOUS PRN
Status: DISCONTINUED | OUTPATIENT
Start: 2023-11-10 | End: 2023-11-10 | Stop reason: HOSPADM

## 2023-11-10 RX ADMIN — PROPOFOL 50 MG: 10 INJECTION, EMULSION INTRAVENOUS at 13:39

## 2023-11-10 RX ADMIN — PROPOFOL 50 MG: 10 INJECTION, EMULSION INTRAVENOUS at 13:33

## 2023-11-10 RX ADMIN — PROPOFOL 50 MG: 10 INJECTION, EMULSION INTRAVENOUS at 13:52

## 2023-11-10 RX ADMIN — PROPOFOL 50 MG: 10 INJECTION, EMULSION INTRAVENOUS at 13:21

## 2023-11-10 RX ADMIN — PROPOFOL 50 MG: 10 INJECTION, EMULSION INTRAVENOUS at 13:24

## 2023-11-10 RX ADMIN — PROPOFOL 100 MG: 10 INJECTION, EMULSION INTRAVENOUS at 13:18

## 2023-11-10 RX ADMIN — LIDOCAINE HYDROCHLORIDE 40 MG: 20 INJECTION, SOLUTION EPIDURAL; INFILTRATION; INTRACAUDAL; PERINEURAL at 13:18

## 2023-11-10 RX ADMIN — PROPOFOL 50 MG: 10 INJECTION, EMULSION INTRAVENOUS at 13:36

## 2023-11-10 RX ADMIN — PROPOFOL 30 MG: 10 INJECTION, EMULSION INTRAVENOUS at 13:44

## 2023-11-10 RX ADMIN — PROPOFOL 50 MG: 10 INJECTION, EMULSION INTRAVENOUS at 13:30

## 2023-11-10 RX ADMIN — SODIUM CHLORIDE, POTASSIUM CHLORIDE, SODIUM LACTATE AND CALCIUM CHLORIDE: 600; 310; 30; 20 INJECTION, SOLUTION INTRAVENOUS at 13:05

## 2023-11-10 RX ADMIN — PROPOFOL 20 MG: 10 INJECTION, EMULSION INTRAVENOUS at 13:49

## 2023-11-10 RX ADMIN — PROPOFOL 50 MG: 10 INJECTION, EMULSION INTRAVENOUS at 13:27

## 2023-11-10 RX ADMIN — PROPOFOL 50 MG: 10 INJECTION, EMULSION INTRAVENOUS at 13:57

## 2023-11-10 RX ADMIN — PROPOFOL 50 MG: 10 INJECTION, EMULSION INTRAVENOUS at 14:02

## 2023-11-10 ASSESSMENT — PAIN SCALES - GENERAL: PAINLEVEL_OUTOF10: 0

## 2023-11-10 NOTE — OP NOTE
[address], [address]  [phone]                              Colonoscopy Procedure Note      Indications:   Personal history colon polyps, prior poor colon prep       :  Sierra Hong MD    Staff: Circulator: Arcelia Tipton RN  Circulator Assist: Vj Benjamin RN    Referring Provider: Earnesteen Curling, MD    Sedation:  MAC anesthesia    Procedure Details:  After informed consent was obtained with all risks and benefits of procedure explained and preoperative exam completed, the patient was taken to the endoscopy suite and placed in the left lateral decubitus position. Upon sequential sedation as per above, a digital rectal exam was performed per below. The Olympus videocolonoscope was inserted in the rectum and carefully advanced to the terminal ileum. The quality of preparation was good. Cactus Bowel Prep Score :3/3/3. The colonoscope was slowly withdrawn with careful evaluation between folds. Retroflexion in the rectum was performed. Findings:   Rectum: small internal hemorrhoids   Sigmoid: normal  Descending Colon: normal  Transverse Colon: one 10 mm sessile polyp - removed with cold snare  Ascending Colon: one 22 mm flat polyp - lifted with 10 cc of blue boost - removed with hot snare in piecemeal fashion, 2 clips empirically placed over polypectomy site, one 8 mm sessile polyp - removed with cold snare  Cecum: one 10 mm sessile polyp - removed with cold snare, one 2 mm sessile polyp - removed with forceps   Terminal Ileum: normal    Interventions:  polypectomy times 5    Specimen Removed:    ID Type Source Tests Collected by Time Destination   1 : Right colon polyps Tissue Colon SURGICAL PATHOLOGY Aretha Dyson MD 11/10/2023 1920        Complications: None. EBL:  minimal     Impression:    See Postoperative diagnosis above    Recommendations:   - Await pathology. You should receive a letter within 2 weeks.    -

## 2023-11-10 NOTE — H&P
[address]  [phone]                                History and Physical     NAME: Gorge Hernandez   :  1970   MRN:  [id number]     HPI:  The patient was seen and examined. Past Surgical History:   Procedure Laterality Date    BACK SURGERY      L3L4 fusion    BREAST BIOPSY Left     NEG. BREAST SURGERY      COLONOSCOPY N/A 2022    COLONOSCOPY (Will need rapid) performed by Haroldo Becerril MD at [facility] ENDOSCOPY    GI  2016    COLONOSCOPY    HERNIA REPAIR Right 1998     Past Medical History:   Diagnosis Date    Ill-defined condition     DEPRESSION     Social History     Tobacco Use    Smoking status: Never    Smokeless tobacco: Never   Substance Use Topics    Alcohol use: Never    Drug use: Never     No Known Allergies  Family History   Problem Relation Age of Onset    No Known Problems Sister     Ovarian Cancer Other         paternal side of family    Breast Cancer Other         paternal cousin    Cancer Paternal Grandmother         unknown type    Colon Cancer Maternal Grandmother     High Cholesterol Mother     Pancreatic Cancer Father         , occular melanoma    Heart Disease Mother      No current facility-administered medications for this encounter. PHYSICAL EXAM:  General: WD, WN. Alert, cooperative, no acute distress    HEENT: NC, Atraumatic. PERRLA, EOMI. Anicteric sclerae. Lungs:  CTA Bilaterally. No Wheezing/Rhonchi/Rales. Heart:  Regular  rhythm,  No murmur, No Rubs, No Gallops  Abdomen: Soft, Non distended, Non tender. +Bowel sounds, no HSM  Extremities: No c/c/e  Neurologic:  CN 2-12 gi, Alert and oriented X 3. No acute neurological distress   Psych:   Good insight. Not anxious nor agitated. The heart, lungs and mental status were satisfactory for the administration of mac sedation and for the procedure.       Mallampati score: 2     The patient was counseled at length about the

## 2023-11-10 NOTE — PROGRESS NOTES

## 2023-11-10 NOTE — ANESTHESIA POSTPROCEDURE EVALUATION
Department of Anesthesiology  Postprocedure Note    Patient: Neelima Dickerson  MRN: [id number]  YOB: 1970  Date of evaluation: 11/10/2023      Procedure Summary     Date: 11/10/23 Room / Location: [address] ENDO 04 / [address] ENDOSCOPY    Anesthesia Start: 1316 Anesthesia Stop: 3742    Procedure: COLONOSCOPY Diagnosis:       Family history of malignant neoplasm of gastrointestinal tract      Personal history of colonic polyps      (Family history of malignant neoplasm of gastrointestinal tract [Z80.0])      (Personal history of colonic polyps [Z86.010])    Surgeons: Ravi lA MD Responsible Provider: Kym Echeverria DO    Anesthesia Type: MAC ASA Status: 2          Anesthesia Type: No value filed.     Francy Phase I:      Francy Phase II: Francy Score: 10      Anesthesia Post Evaluation    Patient location during evaluation: PACU  Level of consciousness: awake  Airway patency: patent  Nausea & Vomiting: no nausea  Complications: no  Cardiovascular status: hemodynamically stable  Respiratory status: acceptable  Hydration status: stable  Multimodal analgesia pain management approach  Pain management: adequate

## 2023-11-10 NOTE — DISCHARGE INSTRUCTIONS
[address] [facility]  [id number]  1970    It was my pleasure seeing you for your procedure. You will also receive a summary report with the findings from this procedure and any further recommendations. If you had polyps removed or biopsies taken during your procedure, you will receive a separate letter from me within approximately the next 2 weeks. If you don't receive this letter or if you have any questions, please call my office [phone]. Please take note of the post procedure instructions listed below. Tyree Majores,    Dr. Elyssa Prado    These instructions give you information on caring for yourself after your procedure. Call your doctor if you have any problems or questions after your procedure. HOME CARE  Walk if you have belly cramping or gas. Walking will help get rid of the air and reduce the bloated feeling in your belly (abdomen). Your IV site (where you received drugs) may be tender to touch. Place warm towels on the site; keep your arm up on two pillows if you have any swelling or soreness in the area. You may shower. ACTIVITY:  Take frequent rest periods and move at a slower pace for the next 24 hours. .  You may resume your regular activity tomorrow if you are feeling back to normal.  Do not drive or ride a bicycle for at least 24 hours (because of the medicine (anesthesia) used during the test). Do not sign any important legal documents or use or operate any machinery for 24 hours  Do not take sleeping medicines/nerve drugs for 24 hours unless the doctor tells you. You can return to work/school tomorrow unless otherwise instructed. NUTRITION:  Drink plenty of fluids to keep your pee (urine) clear or pale yellow  Begin with a light meal and progress to your normal diet.  Heavy or fried foods are harder to digest

## 2024-05-10 ENCOUNTER — ANESTHESIA (OUTPATIENT)
Facility: HOSPITAL | Age: 54
End: 2024-05-10
Payer: COMMERCIAL

## 2024-05-10 ENCOUNTER — HOSPITAL ENCOUNTER (OUTPATIENT)
Facility: HOSPITAL | Age: 54
Setting detail: OUTPATIENT SURGERY
Discharge: HOME | End: 2024-05-10
Attending: INTERNAL MEDICINE | Admitting: INTERNAL MEDICINE
Payer: COMMERCIAL

## 2024-05-10 ENCOUNTER — ANESTHESIA EVENT (OUTPATIENT)
Facility: HOSPITAL | Age: 54
End: 2024-05-10
Payer: COMMERCIAL

## 2024-05-10 VITALS
BODY MASS INDEX: 23.3 KG/M2 | DIASTOLIC BLOOD PRESSURE: 62 MMHG | OXYGEN SATURATION: 100 % | TEMPERATURE: 97.5 F | SYSTOLIC BLOOD PRESSURE: 102 MMHG | WEIGHT: 145 LBS | HEART RATE: 49 BPM | HEIGHT: 66 IN | RESPIRATION RATE: 15 BRPM

## 2024-05-10 PROCEDURE — 7100000011 HC PHASE II RECOVERY - ADDTL 15 MIN: Performed by: INTERNAL MEDICINE

## 2024-05-10 PROCEDURE — 6360000002 HC RX W HCPCS: Performed by: NURSE ANESTHETIST, CERTIFIED REGISTERED

## 2024-05-10 PROCEDURE — 6370000000 HC RX 637 (ALT 250 FOR IP): Performed by: INTERNAL MEDICINE

## 2024-05-10 PROCEDURE — 2500000003 HC RX 250 WO HCPCS: Performed by: NURSE ANESTHETIST, CERTIFIED REGISTERED

## 2024-05-10 PROCEDURE — 3700000001 HC ADD 15 MINUTES (ANESTHESIA): Performed by: INTERNAL MEDICINE

## 2024-05-10 PROCEDURE — 7100000010 HC PHASE II RECOVERY - FIRST 15 MIN: Performed by: INTERNAL MEDICINE

## 2024-05-10 PROCEDURE — 88305 TISSUE EXAM BY PATHOLOGIST: CPT

## 2024-05-10 PROCEDURE — 3600007502: Performed by: INTERNAL MEDICINE

## 2024-05-10 PROCEDURE — 2709999900 HC NON-CHARGEABLE SUPPLY: Performed by: INTERNAL MEDICINE

## 2024-05-10 PROCEDURE — 3700000000 HC ANESTHESIA ATTENDED CARE: Performed by: INTERNAL MEDICINE

## 2024-05-10 PROCEDURE — 3600007512: Performed by: INTERNAL MEDICINE

## 2024-05-10 PROCEDURE — 2580000003 HC RX 258: Performed by: NURSE ANESTHETIST, CERTIFIED REGISTERED

## 2024-05-10 RX ORDER — SODIUM CHLORIDE 9 MG/ML
25 INJECTION, SOLUTION INTRAVENOUS PRN
Status: DISCONTINUED | OUTPATIENT
Start: 2024-05-10 | End: 2024-05-10 | Stop reason: HOSPADM

## 2024-05-10 RX ORDER — LIDOCAINE HYDROCHLORIDE 20 MG/ML
INJECTION, SOLUTION EPIDURAL; INFILTRATION; INTRACAUDAL; PERINEURAL PRN
Status: DISCONTINUED | OUTPATIENT
Start: 2024-05-10 | End: 2024-05-10 | Stop reason: SDUPTHER

## 2024-05-10 RX ORDER — SODIUM CHLORIDE 0.9 % (FLUSH) 0.9 %
5-40 SYRINGE (ML) INJECTION PRN
Status: DISCONTINUED | OUTPATIENT
Start: 2024-05-10 | End: 2024-05-10 | Stop reason: HOSPADM

## 2024-05-10 RX ORDER — MULTIVIT-MIN/FERROUS GLUCONATE 9 MG/15 ML
15 LIQUID (ML) ORAL DAILY
COMMUNITY

## 2024-05-10 RX ORDER — SIMETHICONE 40MG/0.6ML
SUSPENSION, DROPS(FINAL DOSAGE FORM)(ML) ORAL PRN
Status: DISCONTINUED | OUTPATIENT
Start: 2024-05-10 | End: 2024-05-10 | Stop reason: ALTCHOICE

## 2024-05-10 RX ORDER — SODIUM CHLORIDE 0.9 % (FLUSH) 0.9 %
5-40 SYRINGE (ML) INJECTION EVERY 12 HOURS SCHEDULED
Status: DISCONTINUED | OUTPATIENT
Start: 2024-05-10 | End: 2024-05-10 | Stop reason: HOSPADM

## 2024-05-10 RX ORDER — VIT C/B6/B5/MAGNESIUM/HERB 173 50-5-6-5MG
CAPSULE ORAL DAILY
COMMUNITY

## 2024-05-10 RX ORDER — SODIUM CHLORIDE 9 MG/ML
INJECTION, SOLUTION INTRAVENOUS CONTINUOUS PRN
Status: DISCONTINUED | OUTPATIENT
Start: 2024-05-10 | End: 2024-05-10 | Stop reason: SDUPTHER

## 2024-05-10 RX ORDER — SODIUM CHLORIDE 9 MG/ML
INJECTION, SOLUTION INTRAVENOUS CONTINUOUS
Status: DISCONTINUED | OUTPATIENT
Start: 2024-05-10 | End: 2024-05-10 | Stop reason: HOSPADM

## 2024-05-10 RX ORDER — PHENYLEPHRINE HCL IN 0.9% NACL 0.4MG/10ML
SYRINGE (ML) INTRAVENOUS PRN
Status: DISCONTINUED | OUTPATIENT
Start: 2024-05-10 | End: 2024-05-10 | Stop reason: SDUPTHER

## 2024-05-10 RX ADMIN — PROPOFOL 25 MG: 10 INJECTION, EMULSION INTRAVENOUS at 11:25

## 2024-05-10 RX ADMIN — PROPOFOL 25 MG: 10 INJECTION, EMULSION INTRAVENOUS at 11:28

## 2024-05-10 RX ADMIN — Medication 80 MCG: at 11:28

## 2024-05-10 RX ADMIN — Medication 80 MCG: at 11:16

## 2024-05-10 RX ADMIN — SODIUM CHLORIDE: 9 INJECTION, SOLUTION INTRAVENOUS at 10:58

## 2024-05-10 RX ADMIN — PROPOFOL 25 MG: 10 INJECTION, EMULSION INTRAVENOUS at 11:31

## 2024-05-10 RX ADMIN — PROPOFOL 25 MG: 10 INJECTION, EMULSION INTRAVENOUS at 11:34

## 2024-05-10 RX ADMIN — PROPOFOL 50 MG: 10 INJECTION, EMULSION INTRAVENOUS at 11:13

## 2024-05-10 RX ADMIN — PROPOFOL 25 MG: 10 INJECTION, EMULSION INTRAVENOUS at 11:37

## 2024-05-10 RX ADMIN — LIDOCAINE HYDROCHLORIDE 50 MG: 20 INJECTION, SOLUTION EPIDURAL; INFILTRATION; INTRACAUDAL; PERINEURAL at 11:13

## 2024-05-10 RX ADMIN — PROPOFOL 50 MG: 10 INJECTION, EMULSION INTRAVENOUS at 11:14

## 2024-05-10 RX ADMIN — PROPOFOL 25 MG: 10 INJECTION, EMULSION INTRAVENOUS at 11:16

## 2024-05-10 RX ADMIN — PROPOFOL 25 MG: 10 INJECTION, EMULSION INTRAVENOUS at 11:19

## 2024-05-10 RX ADMIN — PROPOFOL 25 MG: 10 INJECTION, EMULSION INTRAVENOUS at 11:22

## 2024-05-10 ASSESSMENT — PAIN SCALES - GENERAL
PAINLEVEL_OUTOF10: 0

## 2024-05-10 ASSESSMENT — PAIN - FUNCTIONAL ASSESSMENT: PAIN_FUNCTIONAL_ASSESSMENT: NONE - DENIES PAIN

## 2024-05-10 NOTE — DISCHARGE INSTRUCTIONS
NORMA GUILLEN [facility]  [phone]                                  Aleta Beckham  [id number]  1970    It was my pleasure seeing you for your procedure.  You will also receive a summary report with the findings from this procedure and any further recommendations.  If you had polyps removed or biopsies taken during your procedure, you will receive a separate letter from me within approximately the next 2 weeks.  If you don't receive this letter or if you have any questions, please call my office [phone].     Please take note of the post procedure instructions listed below.    Dr. Amador Valle      CARE FOLLOWING YOUR PROCEDURE    These instructions give you information on caring for yourself after your procedure. Call your doctor if you have any problems or questions after your procedure.    HOME CARE  Walk if you have belly cramping or gas.  Walking will help get rid of the air and reduce the bloated feeling in your belly (abdomen).  Your IV site (where you received drugs) may be tender to touch.  Place warm towels on the site; keep your arm up on two pillows if you have any swelling or soreness in the area.  You may shower.    ACTIVITY:  Take frequent rest periods and move at a slower pace for the next 24 hours..  You may resume your regular activity tomorrow if you are feeling back to normal.  Do not drive or ride a bicycle for at least 24 hours (because of the medicine (anesthesia) used during the test).  Do not sign any important legal documents or use or operate any machinery for 24 hours  Do not take sleeping medicines/nerve drugs for 24 hours unless the doctor tells you.  You can return to work/school tomorrow unless otherwise instructed.    NUTRITION:  Drink plenty of fluids to keep your pee (urine) clear or pale yellow  Begin with a light meal and progress to your normal diet. Heavy or fried foods are harder to digest and may make you feel sick to your stomach

## 2024-05-10 NOTE — H&P
and for the procedure.      Mallampati score: 2     The patient was counseled at length about the risks of mohinder Covid-19 in the acosta-operative and post-operative states including the recovery window of their procedure.  The patient was made aware that mohinder Covid-19 after a surgical procedure may worsen their prognosis for recovering from the virus and lend to a higher morbidity and or mortality risk.  The patient was given the options of postponing their procedure. All of the risks, benefits, and alternatives were discussed. The patient does wish to proceed with the procedure.      Assessment:   History polyps    Plan:   Endoscopic procedure  MAC sedation

## 2024-05-10 NOTE — ANESTHESIA POSTPROCEDURE EVALUATION
Department of Anesthesiology  Postprocedure Note    Patient: Aleta Beckham  MRN: [id number]  YOB: 1970  Date of evaluation: 5/10/2024    Procedure Summary       Date: 05/10/24 Room / Location: [facility] ENDO 04 / [facility] ENDOSCOPY    Anesthesia Start: 1111 Anesthesia Stop: 1144    Procedure: COLONOSCOPY DIAGNOSTIC Diagnosis:       Constipation, unspecified constipation type      Family history of malignant neoplasm of gastrointestinal tract      Hepatic cyst      Personal history of colonic polyps      Rectal polyp      Special screening for malignant neoplasms, colon      (Constipation, unspecified constipation type [K59.00])      (Family history of malignant neoplasm of gastrointestinal tract [Z80.0])      (Hepatic cyst [K76.89])      (Personal history of colonic polyps [Z86.010])      (Rectal polyp [K62.1])      (Special screening for malignant neoplasms, colon [Z12.11])    Surgeons: Tory English MD Responsible Provider: Guilherme Salazar MD    Anesthesia Type: MAC ASA Status: 1            Anesthesia Type: MAC    Francy Phase I: Francy Score: 10    Francy Phase II: Francy Score: 10    Anesthesia Post Evaluation    Patient location during evaluation: bedside  Nausea & Vomiting: no nausea  Cardiovascular status: blood pressure returned to baseline  Respiratory status: acceptable  Hydration status: euvolemic    No notable events documented.

## 2024-05-10 NOTE — ANESTHESIA PRE PROCEDURE
Department of Anesthesiology  Preprocedure Note       Name:  Aleta Beckham   Age:  53 y.o.  :  1970                                          MRN:  [id number]         Date:  5/10/2024      Surgeon: Surgeon(s):  Tory English MD    Procedure: Procedure(s):  COLONOSCOPY DIAGNOSTIC    Medications prior to admission:   Prior to Admission medications    Medication Sig Start Date End Date Taking? Authorizing Provider   Multiple Vitamins-Minerals (CENTRUM/CERTA-JUDY WITH MINERALS ORAL) solution Take 15 mLs by mouth daily   Yes ProviderVerna MD   GLUCOSAMINE-CHONDROIT-MSM-C-MN PO Take by mouth   Yes Provider, MD Verna   metFORMIN (GLUCOPHAGE) 500 MG tablet Take 0.5 tablets by mouth 2 times daily (with meals)   Yes ProviderVerna MD   turmeric 500 MG CAPS Take by mouth daily   Yes ProviderVerna MD   Cetirizine HCl 10 MG CAPS Take by mouth    Automatic Reconciliation, Ar   Estradiol-Norethindrone Acet 0.5-0.1 MG TABS Take 1 tablet by mouth daily    Automatic Reconciliation, Ar   glycopyrrolate (ROBINUL) 1 MG tablet TK 1 T PO QD PRN 19   Automatic Reconciliation, Ar   potassium gluconate 550 mg tablet Take 99 mg by mouth daily    Automatic Reconciliation, Ar       Current medications:    No current facility-administered medications for this encounter.       Allergies:  No Known Allergies    Problem List:    Patient Active Problem List   Diagnosis Code   • Vulvar mass N90.89   • Lipoma of skin D17.30       Past Medical History:  History reviewed. No pertinent past medical history.    Past Surgical History:        Procedure Laterality Date   • BACK SURGERY      L3L4 fusion   • BREAST BIOPSY Left     NEG.   • BREAST SURGERY     • COLONOSCOPY N/A 2022    COLONOSCOPY (Will need rapid) performed by Tory English MD at [facility] ENDOSCOPY   • COLONOSCOPY N/A 11/10/2023    COLONOSCOPY performed by Tory English MD at [facility] ENDOSCOPY   • GI  2016    COLONOSCOPY   •

## 2024-05-10 NOTE — OP NOTE
[facility]  [address]  [place] [address] [phone]                              Colonoscopy Procedure Note      Indications:   Personal history large colon polyps, serrated polyposis syndrome      :  Tory English MD    Staff: Circulator: Myke Jennings, SKIP; Bhargavi Durán RN    Referring Provider: Haja Flores MD    Sedation:  MAC anesthesia    Procedure Details:  After informed consent was obtained with all risks and benefits of procedure explained and preoperative exam completed, the patient was taken to the endoscopy suite and placed in the left lateral decubitus position.  Upon sequential sedation as per above, a digital rectal exam was performed per below.  The Olympus videocolonoscope was inserted in the rectum and carefully advanced to the terminal ileum.  The quality of preparation was fair.  Gunter Bowel Prep Score : 2/2/3.The colonoscope was slowly withdrawn with careful evaluation between folds. Retroflexion in the rectum was performed.     Findings:   Rectum: small internal hemorrhoids  Sigmoid: normal, one 3 mm sessile polyp - removed with forceps, one 3 mm sessile polyp - removed with cold snare  Descending Colon: normal   Transverse Colon: small amount of adherent stool - lavaged with fair views, normal mucosa   Ascending Colon:  small amount of adherent stool - lavaged with fair views, normal mucosa, prior polypectomy site with scar and no obvious residual polyp tissue. One 4 mm area of raised tissue at border of scar, removed with cold snare  Cecum: small amount of adherent stool - lavaged with fair views,   Terminal Ileum: normal     Interventions:  polypectomy     Specimen Removed:    ID Type Source Tests Collected by Time Destination   1 : Right Colon Polyp Tissue Colon SURGICAL PATHOLOGY Tory English MD 5/10/2024 1132    2 : Left Colon Polyp - 2 polyps Tissue Colon SURGICAL PATHOLOGY Tory English MD 5/10/2024 1139

## 2024-05-20 ASSESSMENT — SLEEP AND FATIGUE QUESTIONNAIRES
HOW LIKELY ARE YOU TO NOD OFF OR FALL ASLEEP WHEN YOU ARE A PASSENGER IN A CAR FOR AN HOUR WITHOUT A BREAK: MODERATE CHANCE OF DOZING
DO YOU HAVE DIFFICULTY BEING AS ACTIVE AS YOU WANT TO BE IN THE MORNING BECAUSE YOU ARE SLEEPY OR TIRED: YES, LITTLE
SELECT ANY OF THE FOLLOWING BEHAVIORS OBSERVED WHILE PATIENT ASLEEP: LIGHT SNORING;PAUSES IN BREATHING;GRINDING TEETH
HOW LIKELY ARE YOU TO NOD OFF OR FALL ASLEEP IN A CAR, WHILE STOPPED FOR A FEW MINUTES IN TRAFFIC: WOULD NEVER DOZE
HOW LIKELY ARE YOU TO NOD OFF OR FALL ASLEEP WHILE SITTING AND READING: HIGH CHANCE OF DOZING
HOW LIKELY ARE YOU TO NOD OFF OR FALL ASLEEP WHILE SITTING QUIETLY AFTER LUNCH WITHOUT ALCOHOL: HIGH CHANCE OF DOZING
ARE YOU BOTHERED BY WAKING UP TOO EARLY AND NOT BEING ABLE TO GET BACK TO SLEEP: YES
HOW LIKELY ARE YOU TO NOD OFF OR FALL ASLEEP WHILE SITTING AND TALKING TO SOMEONE: MODERATE CHANCE OF DOZING
HOW MANY NAPS DO YOU TAKE PER WEEK: 3
HOW LIKELY ARE YOU TO NOD OFF OR FALL ASLEEP WHILE WATCHING TV: MODERATE CHANCE OF DOZING
FOSQ SCORE: 15
DO YOU WORK SHIFTS: NO
DO YOU HAVE DIFFICULTY BEING AS ACTIVE AS YOU WANT TO BE IN THE EVENING BECAUSE YOU ARE SLEEPY OR TIRED: YES, MODERATE
DO YOU HAVE DIFFICULTY OPERATING A MOTOR VEHICLE FOR LONG DISTANCES (GREATER THAN 100 MILES) BECAUSE YOU BECOME SLEEPY: YES, A LITTLE
HOW LIKELY ARE YOU TO NOD OFF OR FALL ASLEEP WHILE SITTING INACTIVE IN A PUBLIC PLACE: MODERATE CHANCE OF DOZING
DO YOU HAVE PROBLEMS WITH FREQUENT AWAKENINGS AT NIGHT: YES
DO YOU GENERALLY HAVE DIFFICULTY REMEMBERING THINGS BECAUSE YOU ARE SLEEPY OR TIRED: YES, A LITTLE
WHAT TIME DO YOU USUALLY WAKE UP: 04:30
HOW LIKELY ARE YOU TO NOD OFF OR FALL ASLEEP WHILE SITTING QUIETLY AFTER LUNCH WITHOUT ALCOHOL: HIGH CHANCE OF DOZING
HOW LIKELY ARE YOU TO NOD OFF OR FALL ASLEEP WHILE SITTING INACTIVE IN A PUBLIC PLACE: MODERATE CHANCE OF DOZING
SELECT ANY OF THE FOLLOWING BEHAVIORS OBSERVED WHILE YOU ARE ASLEEP: LIGHT SNORING
DO YOU GET TOO LITTLE SLEEP AT NIGHT: YES
DO YOU TAKE NAPS: YES
DO YOU HAVE DIFFICULTY OPERATING A MOTOR VEHICLE FOR SHORT DISTANCES (LESS THAN 100 MILES) BECAUSE YOU BECOME SLEEPY: NO
DO YOU HAVE DIFFICULTY CONCENTRATING ON THE THINGS YOU DO BECAUSE YOU ARE SLEEPY OR TIRED: YES, A LITTLE
HOW LIKELY ARE YOU TO NOD OFF OR FALL ASLEEP WHILE LYING DOWN TO REST IN THE AFTERNOON WHEN CIRCUMSTANCES PERMIT: HIGH CHANCE OF DOZING
HOW LIKELY ARE YOU TO NOD OFF OR FALL ASLEEP WHEN YOU ARE A PASSENGER IN A CAR FOR AN HOUR WITHOUT A BREAK: MODERATE CHANCE OF DOZING
AVERAGE NUMBER OF SLEEP HOURS: 6
HAS YOUR RELATIONSHIP WITH FAMILY, FRIENDS OR WORK COLLEAGUES BEEN AFFECTED BECAUSE YOU ARE SLEEPY OR TIRED: YES, A LITTLE
HOW LIKELY ARE YOU TO NOD OFF OR FALL ASLEEP IN A CAR, WHILE STOPPED FOR A FEW MINUTES IN TRAFFIC: WOULD NEVER DOZE
DO YOU HAVE DIFFICULTY WATCHING A MOVIE OR VIDEO BECAUSE YOU BECOME SLEEPY OR TIRED: YES, MODERATE
NUMBER OF TIMES YOU WAKE PER NIGHT: 2
HAS YOUR MOOD BEEN AFFECTED BECAUSE YOU ARE SLEEPY OR TIRED: YES, LITTLE
ARE YOU BOTHERED BY WAKING UP TOO EARLY AND NOT BEING ABLE TO GET BACK TO SLEEP: YES
ESS TOTAL SCORE: 17
DO YOU GET TOO LITTLE SLEEP AT NIGHT: YES
HOW LIKELY ARE YOU TO NOD OFF OR FALL ASLEEP WHILE LYING DOWN TO REST IN THE AFTERNOON WHEN CIRCUMSTANCES PERMIT: HIGH CHANCE OF DOZING
HOW LIKELY ARE YOU TO NOD OFF OR FALL ASLEEP WHILE SITTING AND READING: HIGH CHANCE OF DOZING
HOW LIKELY ARE YOU TO NOD OFF OR FALL ASLEEP WHILE WATCHING TV: MODERATE CHANCE OF DOZING
DO YOU HAVE DIFFICULTY VISITING YOUR FAMILY OR FRIENDS IN THEIR HOME BECAUSE YOU BECOME SLEEPY OR TIRED: NO
AVERAGE NUMBER OF SLEEP HOURS: 6
HOW LIKELY ARE YOU TO NOD OFF OR FALL ASLEEP WHILE SITTING AND TALKING TO SOMEONE: MODERATE CHANCE OF DOZING
SELECT ANY OF THE FOLLOWING BEHAVIORS OBSERVED WHILE YOU ARE ASLEEP: GRINDING TEETH
HOW LONG DO YOU NAP: 10 TO 15 MINUTES
SELECT ANY OF THE FOLLOWING BEHAVIORS OBSERVED WHILE YOU ARE ASLEEP: PAUSES IN BREATHING
WHAT TIME DO YOU USUALLY GO TO BED: 21:45

## 2024-05-23 ENCOUNTER — TELEMEDICINE (OUTPATIENT)
Age: 54
End: 2024-05-23
Payer: COMMERCIAL

## 2024-05-23 DIAGNOSIS — G47.33 OBSTRUCTIVE SLEEP APNEA (ADULT) (PEDIATRIC): Primary | ICD-10-CM

## 2024-05-23 DIAGNOSIS — R73.03 PREDIABETES: ICD-10-CM

## 2024-05-23 PROCEDURE — 99204 OFFICE O/P NEW MOD 45 MIN: CPT | Performed by: INTERNAL MEDICINE

## 2024-05-23 NOTE — PROGRESS NOTES
Aleta Beckham, was evaluated through a synchronous (real-time) audio-video encounter. The patient (or guardian if applicable) is aware that this is a billable service, which includes applicable co-pays. This Virtual Visit was conducted with patient's (and/or legal guardian's) consent. Patient identification was verified, and a caregiver was present when appropriate.   The patient was located at Home: [address] [place] [id number]  Provider was located at Home (Appt Dept State): [place]  Confirm you are appropriately licensed, registered, or certified to deliver care in the state where the patient is located as indicated above. If you are not or unsure, please re-schedule the visit: Yes, I confirm.      Total time spent for this encounter: Not billed by time    --Jennifer Clark MD on 5/23/2024 at 5:11 PM    An electronic signature was used to authenticate this note.         Patient called and identity confirmed with 2 patient identifers     Aleta Beckham is a 53 y.o. female who was seen by synchronous (real-time) audio-video technology on 5/23/2024.           --Jennifer Clark MD on 5/23/2024 at 5:11 PM                                 [address] [facility] [address]  [place] [address]  Tel.  [phone]  Fax. [phone] [address]  [place] [address]  Tel.  [phone]  Fax. [phone] [address] [place] [address]  Tel.  [phone]  Fax. [phone]         Subjective:             Aleta Beckham is an 53 y.o. female self-referred for evaluation for a sleep disorder.       She complains of snoring, periods of not breathing associated with excessive daytime sleepiness.  Symptoms began a few years ago, unchanged since that time. She usually can fall asleep in 15 minutes.  Family or house members note snoring, periods of not breathing. She denies falling asleep while at work, driving.  .  Aleta Beckham does wake up frequently

## 2024-07-01 ENCOUNTER — PROCEDURE VISIT (OUTPATIENT)
Age: 54
End: 2024-07-01

## 2024-07-01 ENCOUNTER — HOSPITAL ENCOUNTER (OUTPATIENT)
Facility: HOSPITAL | Age: 54
Discharge: HOME | End: 2024-07-04
Payer: COMMERCIAL

## 2024-07-01 DIAGNOSIS — G47.33 OBSTRUCTIVE SLEEP APNEA (ADULT) (PEDIATRIC): Primary | ICD-10-CM

## 2024-07-01 PROCEDURE — 95800 SLP STDY UNATTENDED: CPT | Performed by: INTERNAL MEDICINE

## 2024-07-01 PROCEDURE — 95800 SLP STDY UNATTENDED: CPT

## 2024-07-01 NOTE — PROGRESS NOTES
S>Aleta Beckham is a 53 y.o. female seen today to receive a home sleep testing unit (WatchPAT).    Patient was educated on proper hookup and operation of the WatchPAT HST via detailed instruction sheet .  Instruction forms with after hours contact and documentation were signed.    O>    There were no vitals taken for this visit.      A>  1. Obstructive sleep apnea (adult) (pediatric)          P>  General information regarding operations and maintenance of the device was provided.  Follow-up appointment was made to return the WatchPAT HST. She will be contacted once the results have been reviewed.  She was asked to contact our office for any problems regarding her home sleep test study.

## 2024-07-08 ENCOUNTER — CLINICAL DOCUMENTATION (OUTPATIENT)
Age: 54
End: 2024-07-08

## 2024-07-08 NOTE — PROGRESS NOTES
Results of sleep study in R-drive  Lead tech to convey results to patient    HSAT in r-Kickit With.    Tech to convey results to patient          Your test showed mild sleep apnea.    Apnea/hypopnea index was 5/hour. Very mild(lowest 92%) oxygen desaturations. Some snoring noted. A positive test shows an apnea index/AHI of 5 or more/ hour.   Treatment options include      CPAP would take care of all apneas and snoring regardless of position of sleep. If she  is agreeable to use this at night when sleeping , I will order it  and see her after she  has used it a month or so. CPAP is the gold standard for treatment of sleep apnea.   Mouthpiece/dental device-can reduce apneas but they do not work well if sleeping on back so the positioning device is needed with this option in addition to the mouthpiece. Generally, snoring will be reduced but not eliminated.   Avoiding all sleeping on back. I would recommend she  do this with assistance of a positioning device which looks like a big fannypack that is worn to bed to keep off back position (Slumber bump or sleep noodle). These can be purchased online or can be made by taking a fannypack and stuffing it with 2-3 tennis balls.

## 2024-07-12 ENCOUNTER — TELEPHONE (OUTPATIENT)
Age: 54
End: 2024-07-12

## 2024-07-12 NOTE — TELEPHONE ENCOUNTER
Reviewed sleep study results with patient. She expressed understanding. Ms. Ashton will give us a call back and let us know how she would like to proceed.

## 2024-07-22 ENCOUNTER — TELEPHONE (OUTPATIENT)
Age: 54
End: 2024-07-22

## 2024-07-22 DIAGNOSIS — G47.33 OBSTRUCTIVE SLEEP APNEA (ADULT) (PEDIATRIC): Primary | ICD-10-CM

## 2024-07-22 NOTE — TELEPHONE ENCOUNTER
Patient called into the olffice requesting a referral  to Dentistry for Oral Appliance therapy Consult .please put referral in system

## 2024-07-23 ENCOUNTER — CLINICAL DOCUMENTATION (OUTPATIENT)
Age: 54
End: 2024-07-23

## 2024-07-23 NOTE — TELEPHONE ENCOUNTER
Aleta Beckham (: 1970) last seen by Dr. Clark on 2024 for evaluation of sleep disorder.    Home sleep test 2024 showed AHI of 5.0/hr (using 3% criteria) with a lowest SpO2 of 92%.  REM related AHI 16.3/hr. Weight at time of sleep testing 145 pounds.    Patient requested referral for oral appliance.     Orders Placed This Encounter   Procedures    Amb External Referral To Dentistry     Referral Priority:   Routine     Referral Type:   Consult for Advice and Opinion     Referral Reason:   Specialty Services Required     Referred to Provider:   Boom Lopez DDS     Requested Specialty:   Dentistry     Number of Visits Requested:   1    Amb External Referral To Dentistry     Referral Priority:   Routine     Referral Type:   Consult for Advice and Opinion     Referral Reason:   Specialty Services Required     Referred to Provider:   Kavin Riddle DDS     Requested Specialty:   Dentistry     Number of Visits Requested:   1     SABRA Cooley NP, [facility]  24

## 2024-07-24 ENCOUNTER — TELEPHONE (OUTPATIENT)
Age: 54
End: 2024-07-24

## 2024-07-29 DIAGNOSIS — G47.33 OBSTRUCTIVE SLEEP APNEA (ADULT) (PEDIATRIC): Primary | ICD-10-CM

## 2024-08-12 ENCOUNTER — TELEPHONE (OUTPATIENT)
Age: 54
End: 2024-08-12

## 2024-08-14 ENCOUNTER — TELEPHONE (OUTPATIENT)
Age: 54
End: 2024-08-14

## 2024-08-14 NOTE — TELEPHONE ENCOUNTER
Phoned patient returning her call regarding additional documentation for OAT. Order will be resent to dentist.

## 2024-12-29 ENCOUNTER — OFFICE VISIT (OUTPATIENT)
Age: 54
End: 2024-12-29

## 2024-12-29 VITALS
TEMPERATURE: 98 F | DIASTOLIC BLOOD PRESSURE: 81 MMHG | SYSTOLIC BLOOD PRESSURE: 117 MMHG | BODY MASS INDEX: 24.88 KG/M2 | OXYGEN SATURATION: 99 % | WEIGHT: 151.8 LBS | HEART RATE: 92 BPM

## 2024-12-29 DIAGNOSIS — L30.9 DERMATITIS: Primary | ICD-10-CM

## 2024-12-29 RX ORDER — TRIAMCINOLONE ACETONIDE 0.25 MG/G
OINTMENT TOPICAL
Qty: 15 G | Refills: 0 | Status: SHIPPED | OUTPATIENT
Start: 2024-12-29 | End: 2025-01-05

## 2024-12-29 RX ORDER — TRIAMCINOLONE ACETONIDE 0.25 MG/G
OINTMENT TOPICAL
Qty: 15 G | Refills: 0 | Status: SHIPPED | OUTPATIENT
Start: 2024-12-29 | End: 2024-12-29

## 2025-05-16 ENCOUNTER — HOSPITAL ENCOUNTER (OUTPATIENT)
Facility: HOSPITAL | Age: 55
Setting detail: OUTPATIENT SURGERY
Discharge: HOME | End: 2025-05-16
Attending: INTERNAL MEDICINE | Admitting: INTERNAL MEDICINE
Payer: COMMERCIAL

## 2025-05-16 ENCOUNTER — ANESTHESIA (OUTPATIENT)
Facility: HOSPITAL | Age: 55
End: 2025-05-16
Payer: COMMERCIAL

## 2025-05-16 ENCOUNTER — ANESTHESIA EVENT (OUTPATIENT)
Facility: HOSPITAL | Age: 55
End: 2025-05-16
Payer: COMMERCIAL

## 2025-05-16 VITALS
TEMPERATURE: 97.8 F | DIASTOLIC BLOOD PRESSURE: 68 MMHG | WEIGHT: 135 LBS | HEART RATE: 55 BPM | HEIGHT: 66 IN | RESPIRATION RATE: 12 BRPM | SYSTOLIC BLOOD PRESSURE: 128 MMHG | OXYGEN SATURATION: 99 % | BODY MASS INDEX: 21.69 KG/M2

## 2025-05-16 PROCEDURE — 3600007502: Performed by: INTERNAL MEDICINE

## 2025-05-16 PROCEDURE — 2709999900 HC NON-CHARGEABLE SUPPLY: Performed by: INTERNAL MEDICINE

## 2025-05-16 PROCEDURE — 3600007512: Performed by: INTERNAL MEDICINE

## 2025-05-16 PROCEDURE — 3700000001 HC ADD 15 MINUTES (ANESTHESIA): Performed by: INTERNAL MEDICINE

## 2025-05-16 PROCEDURE — 2580000003 HC RX 258: Performed by: NURSE ANESTHETIST, CERTIFIED REGISTERED

## 2025-05-16 PROCEDURE — 7100000010 HC PHASE II RECOVERY - FIRST 15 MIN: Performed by: INTERNAL MEDICINE

## 2025-05-16 PROCEDURE — 6360000002 HC RX W HCPCS: Performed by: NURSE ANESTHETIST, CERTIFIED REGISTERED

## 2025-05-16 PROCEDURE — 3700000000 HC ANESTHESIA ATTENDED CARE: Performed by: INTERNAL MEDICINE

## 2025-05-16 PROCEDURE — 7100000011 HC PHASE II RECOVERY - ADDTL 15 MIN: Performed by: INTERNAL MEDICINE

## 2025-05-16 RX ORDER — SODIUM CHLORIDE 9 MG/ML
INJECTION, SOLUTION INTRAVENOUS CONTINUOUS
Status: DISCONTINUED | OUTPATIENT
Start: 2025-05-16 | End: 2025-05-16 | Stop reason: HOSPADM

## 2025-05-16 RX ORDER — PROGESTERONE 100 MG/1
CAPSULE ORAL
COMMUNITY
Start: 2025-05-12

## 2025-05-16 RX ORDER — SODIUM CHLORIDE 0.9 % (FLUSH) 0.9 %
5-40 SYRINGE (ML) INJECTION PRN
Status: DISCONTINUED | OUTPATIENT
Start: 2025-05-16 | End: 2025-05-16 | Stop reason: HOSPADM

## 2025-05-16 RX ORDER — SODIUM CHLORIDE 0.9 % (FLUSH) 0.9 %
5-40 SYRINGE (ML) INJECTION EVERY 12 HOURS SCHEDULED
Status: DISCONTINUED | OUTPATIENT
Start: 2025-05-16 | End: 2025-05-16 | Stop reason: HOSPADM

## 2025-05-16 RX ORDER — SODIUM CHLORIDE 9 MG/ML
INJECTION, SOLUTION INTRAVENOUS PRN
Status: DISCONTINUED | OUTPATIENT
Start: 2025-05-16 | End: 2025-05-16 | Stop reason: HOSPADM

## 2025-05-16 RX ORDER — LIDOCAINE HYDROCHLORIDE 20 MG/ML
INJECTION, SOLUTION EPIDURAL; INFILTRATION; INTRACAUDAL; PERINEURAL
Status: DISCONTINUED | OUTPATIENT
Start: 2025-05-16 | End: 2025-05-16 | Stop reason: SDUPTHER

## 2025-05-16 RX ORDER — SODIUM CHLORIDE 9 MG/ML
INJECTION, SOLUTION INTRAVENOUS
Status: DISCONTINUED | OUTPATIENT
Start: 2025-05-16 | End: 2025-05-16 | Stop reason: SDUPTHER

## 2025-05-16 RX ADMIN — PROPOFOL 50 MG: 10 INJECTION, EMULSION INTRAVENOUS at 12:40

## 2025-05-16 RX ADMIN — LIDOCAINE HYDROCHLORIDE 40 MG: 20 INJECTION, SOLUTION EPIDURAL; INFILTRATION; INTRACAUDAL; PERINEURAL at 12:39

## 2025-05-16 RX ADMIN — PROPOFOL 50 MG: 10 INJECTION, EMULSION INTRAVENOUS at 12:39

## 2025-05-16 RX ADMIN — SODIUM CHLORIDE: 9 INJECTION, SOLUTION INTRAVENOUS at 11:59

## 2025-05-16 NOTE — ANESTHESIA POSTPROCEDURE EVALUATION
Department of Anesthesiology  Postprocedure Note    Patient: Aleta Beckham  MRN: [id number]  YOB: 1970  Date of evaluation: 5/16/2025    Procedure Summary       Date: 05/16/25 Room / Location: [facility] 02 / [facility] ENDOSCOPY    Anesthesia Start: 1235 Anesthesia Stop: 1259    Procedure: COLONOSCOPY Diagnosis:       Family history of malignant neoplasm of gastrointestinal tract      Hx of colonic polyps      (Family history of malignant neoplasm of gastrointestinal tract [Z80.0])      (Hx of colonic polyps [Z86.0100])    Surgeons: Tory English MD Responsible Provider: Guilherme Salazar MD    Anesthesia Type: MAC ASA Status: 2            Anesthesia Type: MAC    Francy Phase I: Francy Score: 10    Francy Phase II:      Anesthesia Post Evaluation    Patient location during evaluation: bedside  Nausea & Vomiting: no nausea  Cardiovascular status: blood pressure returned to baseline  Respiratory status: acceptable  Hydration status: euvolemic    No notable events documented.

## 2025-05-16 NOTE — PROGRESS NOTES
Verified patient name and date of birth, scheduled procedure, and informed consent. Reviewed general discharge instructions and  information.  Assessed patient. Awake, alert, and oriented per baseline. Vital signs stable (see vital sign flowsheet). Respiratory status within defined limits, abdomen soft and non tender. Skin with in defined limits.       Initial RN admission and assessment performed and documented in Endoscopy navigator.     Patient evaluated by anesthesia in pre-procedure holding.     All procedural vital signs, airway assessment, and level of consciousness information monitored and recorded by anesthesia staff on the anesthesia record.     Report received from CRNA post procedure.  Patient transported to recovery area by RN.    Endoscopy post procedure time out was performed and specimens were verified with physician.    Endoscope was pre-cleaned at bedside immediately following procedure by Kieran

## 2025-05-16 NOTE — ANESTHESIA PRE PROCEDURE
BILITOT 0.6 12/16/2019 02:53 PM    ALKPHOS 39 12/16/2019 02:53 PM    AST 48 12/16/2019 02:53 PM    ALT 37 12/16/2019 02:53 PM       POC Tests: No results for input(s): \"POCGLU\", \"POCNA\", \"POCK\", \"POCCL\", \"POCBUN\", \"POCHEMO\", \"POCHCT\" in the last 72 hours.    Coags: No results found for: \"PROTIME\", \"INR\", \"APTT\"    HCG (If Applicable): No results found for: \"PREGTESTUR\", \"PREGSERUM\", \"HCG\", \"HCGQUANT\"     ABGs: No results found for: \"PHART\", \"PO2ART\", \"IAM5ZSP\", \"QIQ0RMR\", \"BEART\", \"U1TZYDEV\"     Type & Screen (If Applicable):  No results found for: \"ABORH\", \"LABANTI\"    Drug/Infectious Status (If Applicable):  No results found for: \"HIV\", \"HEPCAB\"    COVID-19 Screening (If Applicable):   Lab Results   Component Value Date/Time    COVID19 Not detected 02/04/2022 12:45 PM           Anesthesia Evaluation    Airway: Mallampati: II          Dental:          Pulmonary: breath sounds clear to auscultation                             Cardiovascular:            Rhythm: regular  Rate: normal                    Neuro/Psych:               GI/Hepatic/Renal:             Endo/Other:                     Abdominal:             Vascular:          Other Findings:       Anesthesia Plan      MAC     ASA 2             Anesthetic plan and risks discussed with patient.                    Guilherme Salazar MD   5/16/2025

## 2025-05-16 NOTE — OP NOTE
[facility]  [address]  [place] [address] [phone]                              Colonoscopy Procedure Note      Indications:  Sessile serrated polyposis syndrome    :  Tory English MD    Staff: Circulator: Arnoldo Alexis RN  Endoscopy Technician: Laureen Lopez    Referring Provider: Haja Flores MD    Sedation:  MAC    Procedure Details:  After informed consent was obtained with all risks and benefits of procedure explained and preoperative exam completed, the patient was taken to the endoscopy suite and placed in the left lateral decubitus position.  Upon sequential sedation as per above, a digital rectal exam was performed per below.  The Olympus videocolonoscope was inserted in the rectum and carefully advanced to the ileum.  The quality of preparation was good.  [place] Bowel Prep Score : 3/3/3.The colonoscope was slowly withdrawn with careful evaluation between folds. Retroflexion in the rectum was performed.     Findings:   Rectum: small internal hemorrhoids  Sigmoid: normal   Descending Colon: normal   Transverse Colon: normal   Ascending Colon: normal   Cecum: normal   Terminal Ileum: normal     Interventions:  none    Specimen Removed:  * No specimens in log *    Complications: None.     EBL:  none    Impression:    See Postoperative diagnosis above    Recommendations:   - Resume normal medications.  - Recommend repeat colonoscopy in 2 years with double prep.     Discharge Disposition:  Home in the company of a  when able to ambulate.    Tory English MD  5/16/2025  1:03 PM

## 2025-05-16 NOTE — H&P
[facility]  [address]  [place] [address] [phone]                                History and Physical     NAME: Aleta Beckham   :  1970   MRN:  [id number]     HPI:  The patient was seen and examined.    Past Surgical History:   Procedure Laterality Date    BACK SURGERY      L3L4 fusion    BREAST BIOPSY Left     NEG.    BREAST SURGERY      COLONOSCOPY N/A 2022    COLONOSCOPY (Will need rapid) performed by Tory English MD at [facility] ENDOSCOPY    COLONOSCOPY N/A 11/10/2023    COLONOSCOPY performed by Tory English MD at [facility] ENDOSCOPY    COLONOSCOPY N/A 5/10/2024    COLONOSCOPY DIAGNOSTIC performed by Tory English MD at [facility] ENDOSCOPY    GI  2016    COLONOSCOPY    HERNIA REPAIR Right 1998     History reviewed. No pertinent past medical history.  Social History     Tobacco Use    Smoking status: Never    Smokeless tobacco: Never   Substance Use Topics    Alcohol use: Never    Drug use: Never     Allergies   Allergen Reactions    Bee Venom Swelling     Reaction Type: Allergy     Family History   Problem Relation Age of Onset    No Known Problems Sister     Ovarian Cancer Other         paternal side of family    Breast Cancer Other         paternal cousin    Cancer Paternal Grandmother         unknown type    Colon Cancer Maternal Grandmother     High Cholesterol Mother     Pancreatic Cancer Father         , occular melanoma    Heart Disease Mother      No current facility-administered medications for this encounter.         PHYSICAL EXAM:  General: WD, WN. Alert, cooperative, no acute distress    HEENT: NC, Atraumatic.  PERRLA, EOMI. Anicteric sclerae.  Lungs:  CTA Bilaterally. No Wheezing/Rhonchi/Rales.  Heart:  Regular  rhythm,  No murmur, No Rubs, No Gallops  Abdomen: Soft, Non distended, Non tender.  +Bowel sounds, no HSM  Extremities: No c/c/e  Neurologic:  CN 2-12 gi, Alert and oriented X 3.  No acute neurological distress

## (undated) DEVICE — BASIN SET MIN W/O CAUT PNCL --

## (undated) DEVICE — GOWN,SIRUS,NONRNF,SETINSLV,XL,20/CS: Brand: MEDLINE

## (undated) DEVICE — INFECTION CONTROL KIT SYS

## (undated) DEVICE — SYR 10ML LUER LOK 1/5ML GRAD --

## (undated) DEVICE — PAD,SANITARY,11 IN,MAXI,N-STRL,IND WRAP: Brand: MEDLINE

## (undated) DEVICE — GARMENT,MEDLINE,DVT,INT,CALF,MED, GEN2: Brand: MEDLINE

## (undated) DEVICE — TRAP SURG QUAD PARABOLA SLOT DSGN SFTY SCRN TRAPEASE

## (undated) DEVICE — SNARE VASC L240CM LOOP W10MM SHTH DIA2.4MM RND STIFF CLD

## (undated) DEVICE — SINGLE-USE POLYPECTOMY SNARE: Brand: CAPTIVATOR

## (undated) DEVICE — REM POLYHESIVE ADULT PATIENT RETURN ELECTRODE: Brand: VALLEYLAB

## (undated) DEVICE — Device: Brand: DISPOSABLE ELECTROSURGICAL SNARE

## (undated) DEVICE — HANDLE LT SNAP ON ULT DURABLE LENS FOR TRUMPF ALC DISPOSABLE

## (undated) DEVICE — SYRINGE IRRIG 60ML SFT PLIABLE BLB EZ TO GRP 1 HND USE W/

## (undated) DEVICE — TUBING, SUCTION, 1/4" X 12', STRAIGHT: Brand: MEDLINE

## (undated) DEVICE — SUPPLEMENT DIGESTIVE H2O SOL GI-EASE

## (undated) DEVICE — SNARE ENDOSCP POLYP MED STD AD 2.4X27X240 CM 2.8 MM OVL SENS

## (undated) DEVICE — Z INACTIVE USE 2527070 DRAPE SURG W40XL44IN UNDERBUTTOCK SMS POLYPR W/ PCH BK DISP

## (undated) DEVICE — TRAY PREP DRY W/ PREM GLV 2 APPL 6 SPNG 2 UNDPD 1 OVERWRAP

## (undated) DEVICE — TUBING HYDR IRR --

## (undated) DEVICE — AGENT LIFTING 10 CC SUBMUCOSAL INJ SOLUTION STRL BLUEBOOST

## (undated) DEVICE — FORCEPS BX L240CM JAW DIA2.4MM ORNG L CAP W/ NDL DISP RAD

## (undated) DEVICE — FORCEPS BX L240CM JAW DIA2.8MM L CAP W/ NDL MIC MESH TOOTH

## (undated) DEVICE — STRAP,POSITIONING,KNEE/BODY,FOAM,4X60": Brand: MEDLINE

## (undated) DEVICE — SUTURE VCRL SZ 3-0 L27IN ABSRB UD L26MM SH 1/2 CIR J416H

## (undated) DEVICE — ELECTRODE NDL 2.8IN COAT VALLEYLAB

## (undated) DEVICE — DRAPE,LITHOTOMY,STERILE: Brand: MEDLINE

## (undated) DEVICE — Device: Brand: SINGLE USE INJECTOR NM600/610

## (undated) DEVICE — TUBING IRRIG COMPATIBLE W ERBE MEDIVATOR PMP HYDR

## (undated) DEVICE — YANKAUER,TAPERED BULBOUS TIP,W/O VENT: Brand: MEDLINE

## (undated) DEVICE — PAD ELECSURG GRND AD SLD N REM CRD DISP

## (undated) DEVICE — STERILE POLYISOPRENE POWDER-FREE SURGICAL GLOVES WITH EMOLLIENT COATING: Brand: PROTEXIS